# Patient Record
Sex: FEMALE | Race: WHITE | NOT HISPANIC OR LATINO
[De-identification: names, ages, dates, MRNs, and addresses within clinical notes are randomized per-mention and may not be internally consistent; named-entity substitution may affect disease eponyms.]

---

## 2017-02-16 ENCOUNTER — APPOINTMENT (OUTPATIENT)
Dept: ULTRASOUND IMAGING | Facility: CLINIC | Age: 37
End: 2017-02-16

## 2017-09-20 ENCOUNTER — EMERGENCY (EMERGENCY)
Facility: HOSPITAL | Age: 37
LOS: 1 days | Discharge: HOME | End: 2017-09-20
Attending: EMERGENCY MEDICINE | Admitting: EMERGENCY MEDICINE
Payer: COMMERCIAL

## 2017-09-20 VITALS
TEMPERATURE: 99 F | DIASTOLIC BLOOD PRESSURE: 85 MMHG | HEART RATE: 93 BPM | RESPIRATION RATE: 18 BRPM | OXYGEN SATURATION: 100 % | WEIGHT: 160.06 LBS | SYSTOLIC BLOOD PRESSURE: 137 MMHG

## 2017-09-20 PROCEDURE — 99284 EMERGENCY DEPT VISIT MOD MDM: CPT | Mod: 25

## 2017-09-20 PROCEDURE — 93010 ELECTROCARDIOGRAM REPORT: CPT

## 2017-09-20 PROCEDURE — 71020: CPT | Mod: 26

## 2017-09-20 PROCEDURE — 93005 ELECTROCARDIOGRAM TRACING: CPT

## 2017-09-20 PROCEDURE — 71046 X-RAY EXAM CHEST 2 VIEWS: CPT

## 2017-09-20 RX ORDER — ALPRAZOLAM 0.25 MG
1 TABLET ORAL
Qty: 6 | Refills: 0
Start: 2017-09-20 | End: 2017-09-22

## 2017-09-20 RX ORDER — ALPRAZOLAM 0.25 MG
0.25 TABLET ORAL ONCE
Qty: 0 | Refills: 0 | Status: DISCONTINUED | OUTPATIENT
Start: 2017-09-20 | End: 2017-09-20

## 2017-09-20 NOTE — ED PROVIDER NOTE - PLAN OF CARE
1. Take xanax 1 tablet up to every 8 hours as needed for severe anxiety. Do not drink alcohol or drive with this medication. You must follow-up with your primary care physician if you feel this medication is helpful to you to continue.   2. Hydrate yourself well throughout the day  3. See your doctor this week to discuss further treatment for anxiety  4. Return to the ER for any new or worsening symptoms

## 2017-09-20 NOTE — ED PROVIDER NOTE - CARE PLAN
Principal Discharge DX:	Anxiety  Instructions for follow-up, activity and diet:	1. Take xanax 1 tablet up to every 8 hours as needed for severe anxiety. Do not drink alcohol or drive with this medication. You must follow-up with your primary care physician if you feel this medication is helpful to you to continue.   2. Hydrate yourself well throughout the day  3. See your doctor this week to discuss further treatment for anxiety  4. Return to the ER for any new or worsening symptoms

## 2017-09-20 NOTE — ED PROVIDER NOTE - OBJECTIVE STATEMENT
36 y/o F pmhx anxiety, presenting with chest pain and shortness of breath x4 days. Pt states last week she 'was doing a lot of celebrating and drinking wine multiple days in a row,' then Sunday developed burning sensation in center of chest that has been intermittent since that time. She states that she has had shortness of breath intermittently associated with this. Reports had symptoms similar to this once in the past, was told by her PMD that she had anxiety. Is worried about her heart today but 'hopes it is just anxiety again.' She denies any drinking since symptoms started. Reports only had 2-3 glasses of wine on those days of drinking. Denies any drug abuse or cigarette smoking. Denies any long plane, train or car rides.

## 2017-09-20 NOTE — ED PROVIDER NOTE - ATTENDING CONTRIBUTION TO CARE
38 y/o F no pmhx p/w chest pain and shortness of breath described as burning sensation after a 3 days of drinking wine last week; no drinking since that time. PE unremarkable. Episodes of anxiety in the past, feels the same but more concerned. Will obtain cxr, provide xanax and obtain urine pregnancy, reassess,

## 2017-09-20 NOTE — ED PROVIDER NOTE - MEDICAL DECISION MAKING DETAILS
36 y/o F no pmhx p/w chest pain and shortness of breath described as burning sensation after a 3 days of drinking wine last week; no drinking since that time. PE unremarkable. Episodes of anxiety in the past, feels the same but more concerned. Will obtain cxr, provide xanax and obtain urine pregnancy, reassess,

## 2017-09-20 NOTE — ED PROVIDER NOTE - PROGRESS NOTE DETAILS
patient feeling improved states she feels less anxious that her urine pregnancy test is negative and EKG CXR is normal. will follow-up with pmd advised on possible need for further evaluation for anxiety and continued treatment. -Lola Henao PA-C

## 2017-09-20 NOTE — ED ADULT NURSE NOTE - OBJECTIVE STATEMENT
Pt comes in with epigastric burning Pt has hx of anxiety and chantal been drinking A LOT OF WINE PAST COUPLE OF DAYS pt also sates she ha s some shortness of breath past couple of days Lungs clear Pt  was offered Xanax but she is driving pending chest x-ray [facility]

## 2017-09-20 NOTE — ED ADULT NURSE REASSESSMENT NOTE - NS ED NURSE REASSESS COMMENT FT1
2030 PReg negative and pt is driving no Xanax given as ordered.  Pending x-ray and EKG NSR [facility]

## 2019-07-17 ENCOUNTER — OUTPATIENT (OUTPATIENT)
Dept: INPATIENT UNIT | Facility: HOSPITAL | Age: 39
LOS: 1 days | Discharge: HOME | End: 2019-07-17

## 2019-07-17 VITALS
RESPIRATION RATE: 16 BRPM | OXYGEN SATURATION: 100 % | HEART RATE: 105 BPM | TEMPERATURE: 98 F | SYSTOLIC BLOOD PRESSURE: 130 MMHG | DIASTOLIC BLOOD PRESSURE: 89 MMHG

## 2019-07-17 VITALS — HEART RATE: 72 BPM | DIASTOLIC BLOOD PRESSURE: 69 MMHG | SYSTOLIC BLOOD PRESSURE: 116 MMHG

## 2019-07-17 DIAGNOSIS — O26.899 OTHER SPECIFIED PREGNANCY RELATED CONDITIONS, UNSPECIFIED TRIMESTER: ICD-10-CM

## 2019-07-17 DIAGNOSIS — Z3A.00 WEEKS OF GESTATION OF PREGNANCY NOT SPECIFIED: ICD-10-CM

## 2019-07-17 LAB
ALBUMIN SERPL ELPH-MCNC: 3.6 G/DL — SIGNIFICANT CHANGE UP (ref 3.3–5)
ALP SERPL-CCNC: 186 U/L — HIGH (ref 40–120)
ALT FLD-CCNC: 20 U/L — SIGNIFICANT CHANGE UP (ref 4–33)
ANION GAP SERPL CALC-SCNC: 15 MMO/L — HIGH (ref 7–14)
APPEARANCE UR: SIGNIFICANT CHANGE UP
APTT BLD: 25.9 SEC — LOW (ref 27.5–36.3)
AST SERPL-CCNC: 20 U/L — SIGNIFICANT CHANGE UP (ref 4–32)
BACTERIA # UR AUTO: SIGNIFICANT CHANGE UP
BASOPHILS # BLD AUTO: 0.04 K/UL — SIGNIFICANT CHANGE UP (ref 0–0.2)
BASOPHILS NFR BLD AUTO: 0.4 % — SIGNIFICANT CHANGE UP (ref 0–2)
BILIRUB SERPL-MCNC: 0.2 MG/DL — SIGNIFICANT CHANGE UP (ref 0.2–1.2)
BILIRUB UR-MCNC: NEGATIVE — SIGNIFICANT CHANGE UP
BLD GP AB SCN SERPL QL: NEGATIVE — SIGNIFICANT CHANGE UP
BLOOD UR QL VISUAL: NEGATIVE — SIGNIFICANT CHANGE UP
BUN SERPL-MCNC: 12 MG/DL — SIGNIFICANT CHANGE UP (ref 7–23)
CALCIUM SERPL-MCNC: 9.2 MG/DL — SIGNIFICANT CHANGE UP (ref 8.4–10.5)
CHLORIDE SERPL-SCNC: 103 MMOL/L — SIGNIFICANT CHANGE UP (ref 98–107)
CO2 SERPL-SCNC: 19 MMOL/L — LOW (ref 22–31)
COLOR SPEC: YELLOW — SIGNIFICANT CHANGE UP
CREAT ?TM UR-MCNC: 73.3 MG/DL — SIGNIFICANT CHANGE UP
CREAT SERPL-MCNC: 0.68 MG/DL — SIGNIFICANT CHANGE UP (ref 0.5–1.3)
EOSINOPHIL # BLD AUTO: 0.07 K/UL — SIGNIFICANT CHANGE UP (ref 0–0.5)
EOSINOPHIL NFR BLD AUTO: 0.8 % — SIGNIFICANT CHANGE UP (ref 0–6)
FIBRINOGEN PPP-MCNC: 720 MG/DL — HIGH (ref 350–510)
GLUCOSE SERPL-MCNC: 87 MG/DL — SIGNIFICANT CHANGE UP (ref 70–99)
GLUCOSE UR-MCNC: NEGATIVE — SIGNIFICANT CHANGE UP
HCT VFR BLD CALC: 33.3 % — LOW (ref 34.5–45)
HGB BLD-MCNC: 11.2 G/DL — LOW (ref 11.5–15.5)
HYALINE CASTS # UR AUTO: NEGATIVE — SIGNIFICANT CHANGE UP
IMM GRANULOCYTES NFR BLD AUTO: 0.5 % — SIGNIFICANT CHANGE UP (ref 0–1.5)
INR BLD: 0.84 — LOW (ref 0.88–1.17)
KETONES UR-MCNC: NEGATIVE — SIGNIFICANT CHANGE UP
LDH SERPL L TO P-CCNC: 151 U/L — SIGNIFICANT CHANGE UP (ref 135–225)
LEUKOCYTE ESTERASE UR-ACNC: NEGATIVE — SIGNIFICANT CHANGE UP
LYMPHOCYTES # BLD AUTO: 1.97 K/UL — SIGNIFICANT CHANGE UP (ref 1–3.3)
LYMPHOCYTES # BLD AUTO: 21.1 % — SIGNIFICANT CHANGE UP (ref 13–44)
MCHC RBC-ENTMCNC: 29.6 PG — SIGNIFICANT CHANGE UP (ref 27–34)
MCHC RBC-ENTMCNC: 33.6 % — SIGNIFICANT CHANGE UP (ref 32–36)
MCV RBC AUTO: 87.9 FL — SIGNIFICANT CHANGE UP (ref 80–100)
MONOCYTES # BLD AUTO: 0.7 K/UL — SIGNIFICANT CHANGE UP (ref 0–0.9)
MONOCYTES NFR BLD AUTO: 7.5 % — SIGNIFICANT CHANGE UP (ref 2–14)
NEUTROPHILS # BLD AUTO: 6.5 K/UL — SIGNIFICANT CHANGE UP (ref 1.8–7.4)
NEUTROPHILS NFR BLD AUTO: 69.7 % — SIGNIFICANT CHANGE UP (ref 43–77)
NITRITE UR-MCNC: NEGATIVE — SIGNIFICANT CHANGE UP
NRBC # FLD: 0 K/UL — SIGNIFICANT CHANGE UP (ref 0–0)
PH UR: 6.5 — SIGNIFICANT CHANGE UP (ref 5–8)
PLATELET # BLD AUTO: 215 K/UL — SIGNIFICANT CHANGE UP (ref 150–400)
PMV BLD: 11.2 FL — SIGNIFICANT CHANGE UP (ref 7–13)
POTASSIUM SERPL-MCNC: 3.8 MMOL/L — SIGNIFICANT CHANGE UP (ref 3.5–5.3)
POTASSIUM SERPL-SCNC: 3.8 MMOL/L — SIGNIFICANT CHANGE UP (ref 3.5–5.3)
PROT SERPL-MCNC: 6.6 G/DL — SIGNIFICANT CHANGE UP (ref 6–8.3)
PROT UR-MCNC: 10 — SIGNIFICANT CHANGE UP
PROT UR-MCNC: 14.1 MG/DL — SIGNIFICANT CHANGE UP
PROTHROM AB SERPL-ACNC: 9.5 SEC — LOW (ref 9.8–13.1)
RBC # BLD: 3.79 M/UL — LOW (ref 3.8–5.2)
RBC # FLD: 13 % — SIGNIFICANT CHANGE UP (ref 10.3–14.5)
RBC CASTS # UR COMP ASSIST: SIGNIFICANT CHANGE UP (ref 0–?)
RH IG SCN BLD-IMP: POSITIVE — SIGNIFICANT CHANGE UP
SODIUM SERPL-SCNC: 137 MMOL/L — SIGNIFICANT CHANGE UP (ref 135–145)
SP GR SPEC: 1.01 — SIGNIFICANT CHANGE UP (ref 1–1.04)
SQUAMOUS # UR AUTO: SIGNIFICANT CHANGE UP
URATE SERPL-MCNC: 5.5 MG/DL — SIGNIFICANT CHANGE UP (ref 2.5–7)
UROBILINOGEN FLD QL: NORMAL — SIGNIFICANT CHANGE UP
WBC # BLD: 9.33 K/UL — SIGNIFICANT CHANGE UP (ref 3.8–10.5)
WBC # FLD AUTO: 9.33 K/UL — SIGNIFICANT CHANGE UP (ref 3.8–10.5)
WBC UR QL: HIGH (ref 0–?)

## 2019-07-17 NOTE — OB PROVIDER TRIAGE NOTE - NSHPPHYSICALEXAM_GEN_ALL_CORE
abdomen abdomen soft, nontender  taus: sliup, cephalic presentation, anterior placenta, bpp 8/8, domingo 15.5, efw 3173 grams  nst in progress

## 2019-07-17 NOTE — OB PROVIDER TRIAGE NOTE - HISTORY OF PRESENT ILLNESS
39 y.o.  LINDA 19 @ 40 weeks presents from scheduled prenatal appointment for evaluation of elevated BP in office of 140/100 x 4. 39 y.o.  LINDA 19 @ 40 weeks presents from scheduled prenatal appointment for evaluation of elevated BP in office of 140/100 x 4. Pt denies HA, visual disturbance, epigastric pain, RUQ pain. VE per Dr Zavala in office 0/0/-3/I.

## 2019-07-17 NOTE — OB PROVIDER TRIAGE NOTE - NSOBPROVIDERNOTE_OBGYN_ALL_OB_FT
39 y.o.  LINDA 19 @ 40 week IVF pregnancy presents from scheduled prenatal appointment for evaluation of elevated BP in office of 140/100 x 4. Pt denies HA, visual disturbance, epigastric pain, RUQ pain. Pt has no prior history of elevated BP in office. VE per Dr Zavala in office 0/0/-3/I.    allergies:  NKDA  medications:  prenatal vitamins  vitamin D supplement 2000 units    med/surg hx:  denies  OBGYN hx:  denies  psychosocial hx:  anxiety no meds    abdomen soft, nontender  taus: sliup, cephalic presentation, anterior placenta, bpp 8/8, domingo 15.5, efw 3173 grams  nst in progress    HELLP labs pending 39 y.o.  LINDA 19 @ 40 week IVF pregnancy presents from scheduled prenatal appointment for evaluation of elevated BP in office of 140/100 x 4. Pt denies HA, visual disturbance, epigastric pain, RUQ pain. Pt has no prior history of elevated BP in office. VE per Dr Zavala in office 0/0/-3/I.    allergies:  NKDA  medications:  prenatal vitamins  vitamin D supplement 2000 units    med/surg hx:  denies  OBGYN hx:  denies  psychosocial hx:  anxiety-no meds    abdomen soft, nontender  taus: sliup, cephalic presentation, anterior placenta, bpp 8/8, domingo 15.5, efw 3173 grams  nst in progress    HELLP labs pending 39 y.o.  LINDA 19 @ 40 week IVF pregnancy presents from scheduled prenatal appointment for evaluation of elevated BP in office of 140/100 x 4. Pt denies HA, visual disturbance, epigastric pain, RUQ pain. Pt has no prior history of elevated BP in office. VE per Dr Zavala in office 0/0/-3/I.    allergies:  NKDA  medications:  prenatal vitamins  vitamin D supplement 2000 units    med/surg hx:  denies  OBGYN hx:  denies  psychosocial hx:  anxiety-0.25 mg xanax 1 tab 3 times daily prn    abdomen soft, nontender  taus: sliup, cephalic presentation, anterior placenta, bpp 8/8, domingo 15.5, efw 3173 grams  nst in progress    HELLP labs pending 39 y.o.  LINDA 19 @ 40 week IVF pregnancy presents from scheduled prenatal appointment for evaluation of elevated BP in office of 140/100 x 4. Pt denies HA, visual disturbance, epigastric pain, RUQ pain. Pt has no prior history of elevated BP in office. VE per Dr Zavala in office 0/0/-3/I.    allergies:  NKDA  medications:  prenatal vitamins  vitamin D supplement 2000 units    med/surg hx:  denies  OBGYN hx:  denies  psychosocial hx:  anxiety-0.25 mg xanax 1 tab 3 times daily prn    abdomen soft, nontender  taus: sliup, cephalic presentation, anterior placenta, bpp 8/8, domingo 15.5, efw 3173 grams  nst in progress    HELLP labs pending      Received care of patient.   Bp Range: 116-137/64-80  HELLP Labs WNL  Discussed with Dr. Waddell:  -pt cleared for discharge home  -Labor precautions reviewed  -Kick counts reviewed  -Pt instructed to follow up with next scheduled appointment 2019  -Verbal and written discharge instructions given, Pt and  verbalized understanding

## 2019-07-18 ENCOUNTER — OUTPATIENT (OUTPATIENT)
Dept: INPATIENT UNIT | Facility: HOSPITAL | Age: 39
LOS: 1 days | Discharge: HOME | End: 2019-07-18
Payer: COMMERCIAL

## 2019-07-18 VITALS
SYSTOLIC BLOOD PRESSURE: 135 MMHG | TEMPERATURE: 98 F | DIASTOLIC BLOOD PRESSURE: 81 MMHG | HEART RATE: 85 BPM | RESPIRATION RATE: 20 BRPM

## 2019-07-18 VITALS — OXYGEN SATURATION: 97 % | HEART RATE: 82 BPM

## 2019-07-18 DIAGNOSIS — Z3A.00 WEEKS OF GESTATION OF PREGNANCY NOT SPECIFIED: ICD-10-CM

## 2019-07-18 DIAGNOSIS — O26.899 OTHER SPECIFIED PREGNANCY RELATED CONDITIONS, UNSPECIFIED TRIMESTER: ICD-10-CM

## 2019-07-18 LAB
ALBUMIN SERPL ELPH-MCNC: 3.3 G/DL — SIGNIFICANT CHANGE UP (ref 3.3–5)
ALP SERPL-CCNC: 172 U/L — HIGH (ref 40–120)
ALT FLD-CCNC: 16 U/L — SIGNIFICANT CHANGE UP (ref 4–33)
ANION GAP SERPL CALC-SCNC: 12 MMO/L — SIGNIFICANT CHANGE UP (ref 7–14)
APPEARANCE UR: SIGNIFICANT CHANGE UP
APTT BLD: 25.8 SEC — LOW (ref 27.5–36.3)
AST SERPL-CCNC: 17 U/L — SIGNIFICANT CHANGE UP (ref 4–32)
BACTERIA # UR AUTO: SIGNIFICANT CHANGE UP
BASOPHILS # BLD AUTO: 0.03 K/UL — SIGNIFICANT CHANGE UP (ref 0–0.2)
BASOPHILS NFR BLD AUTO: 0.3 % — SIGNIFICANT CHANGE UP (ref 0–2)
BILIRUB SERPL-MCNC: < 0.2 MG/DL — LOW (ref 0.2–1.2)
BILIRUB UR-MCNC: NEGATIVE — SIGNIFICANT CHANGE UP
BLOOD UR QL VISUAL: NEGATIVE — SIGNIFICANT CHANGE UP
BUN SERPL-MCNC: 10 MG/DL — SIGNIFICANT CHANGE UP (ref 7–23)
CALCIUM SERPL-MCNC: 9 MG/DL — SIGNIFICANT CHANGE UP (ref 8.4–10.5)
CHLORIDE SERPL-SCNC: 106 MMOL/L — SIGNIFICANT CHANGE UP (ref 98–107)
CO2 SERPL-SCNC: 19 MMOL/L — LOW (ref 22–31)
COLOR SPEC: YELLOW — SIGNIFICANT CHANGE UP
CREAT ?TM UR-MCNC: 69.9 MG/DL — SIGNIFICANT CHANGE UP
CREAT SERPL-MCNC: 0.64 MG/DL — SIGNIFICANT CHANGE UP (ref 0.5–1.3)
EOSINOPHIL # BLD AUTO: 0.09 K/UL — SIGNIFICANT CHANGE UP (ref 0–0.5)
EOSINOPHIL NFR BLD AUTO: 1 % — SIGNIFICANT CHANGE UP (ref 0–6)
FIBRINOGEN PPP-MCNC: 674.7 MG/DL — HIGH (ref 350–510)
GLUCOSE SERPL-MCNC: 92 MG/DL — SIGNIFICANT CHANGE UP (ref 70–99)
GLUCOSE UR-MCNC: NEGATIVE — SIGNIFICANT CHANGE UP
HCT VFR BLD CALC: 33.1 % — LOW (ref 34.5–45)
HGB BLD-MCNC: 10.9 G/DL — LOW (ref 11.5–15.5)
HYALINE CASTS # UR AUTO: NEGATIVE — SIGNIFICANT CHANGE UP
IMM GRANULOCYTES NFR BLD AUTO: 0.6 % — SIGNIFICANT CHANGE UP (ref 0–1.5)
INR BLD: 0.85 — LOW (ref 0.88–1.17)
KETONES UR-MCNC: NEGATIVE — SIGNIFICANT CHANGE UP
LDH SERPL L TO P-CCNC: 144 U/L — SIGNIFICANT CHANGE UP (ref 135–225)
LEUKOCYTE ESTERASE UR-ACNC: NEGATIVE — SIGNIFICANT CHANGE UP
LYMPHOCYTES # BLD AUTO: 2.43 K/UL — SIGNIFICANT CHANGE UP (ref 1–3.3)
LYMPHOCYTES # BLD AUTO: 26.9 % — SIGNIFICANT CHANGE UP (ref 13–44)
MCHC RBC-ENTMCNC: 29.5 PG — SIGNIFICANT CHANGE UP (ref 27–34)
MCHC RBC-ENTMCNC: 32.9 % — SIGNIFICANT CHANGE UP (ref 32–36)
MCV RBC AUTO: 89.7 FL — SIGNIFICANT CHANGE UP (ref 80–100)
MONOCYTES # BLD AUTO: 0.63 K/UL — SIGNIFICANT CHANGE UP (ref 0–0.9)
MONOCYTES NFR BLD AUTO: 7 % — SIGNIFICANT CHANGE UP (ref 2–14)
NEUTROPHILS # BLD AUTO: 5.81 K/UL — SIGNIFICANT CHANGE UP (ref 1.8–7.4)
NEUTROPHILS NFR BLD AUTO: 64.2 % — SIGNIFICANT CHANGE UP (ref 43–77)
NITRITE UR-MCNC: NEGATIVE — SIGNIFICANT CHANGE UP
NRBC # FLD: 0.08 K/UL — SIGNIFICANT CHANGE UP (ref 0–0)
PH UR: 6.5 — SIGNIFICANT CHANGE UP (ref 5–8)
PLATELET # BLD AUTO: 199 K/UL — SIGNIFICANT CHANGE UP (ref 150–400)
PMV BLD: 10.9 FL — SIGNIFICANT CHANGE UP (ref 7–13)
POTASSIUM SERPL-MCNC: 4.2 MMOL/L — SIGNIFICANT CHANGE UP (ref 3.5–5.3)
POTASSIUM SERPL-SCNC: 4.2 MMOL/L — SIGNIFICANT CHANGE UP (ref 3.5–5.3)
PROT SERPL-MCNC: 6.4 G/DL — SIGNIFICANT CHANGE UP (ref 6–8.3)
PROT UR-MCNC: 11.2 MG/DL — SIGNIFICANT CHANGE UP
PROT UR-MCNC: NEGATIVE — SIGNIFICANT CHANGE UP
PROTHROM AB SERPL-ACNC: 9.4 SEC — LOW (ref 9.8–13.1)
RBC # BLD: 3.69 M/UL — LOW (ref 3.8–5.2)
RBC # FLD: 12.9 % — SIGNIFICANT CHANGE UP (ref 10.3–14.5)
RBC CASTS # UR COMP ASSIST: SIGNIFICANT CHANGE UP (ref 0–?)
SODIUM SERPL-SCNC: 137 MMOL/L — SIGNIFICANT CHANGE UP (ref 135–145)
SP GR SPEC: 1.01 — SIGNIFICANT CHANGE UP (ref 1–1.04)
SQUAMOUS # UR AUTO: SIGNIFICANT CHANGE UP
URATE SERPL-MCNC: 5.3 MG/DL — SIGNIFICANT CHANGE UP (ref 2.5–7)
UROBILINOGEN FLD QL: NORMAL — SIGNIFICANT CHANGE UP
WBC # BLD: 9.04 K/UL — SIGNIFICANT CHANGE UP (ref 3.8–10.5)
WBC # FLD AUTO: 9.04 K/UL — SIGNIFICANT CHANGE UP (ref 3.8–10.5)
WBC UR QL: SIGNIFICANT CHANGE UP (ref 0–?)

## 2019-07-18 PROCEDURE — 59025 FETAL NON-STRESS TEST: CPT | Mod: 26

## 2019-07-18 NOTE — OB PROVIDER TRIAGE NOTE - NSHPPHYSICALEXAM_GEN_ALL_CORE
normal (ped)... Bp Range:119-130/71-83  Abdomen: Soft, non-tender on palpation  NST:  With moderate variability, accelerations, no decelerations  [place]: No ctx noted on toco   HELLP Labs pending

## 2019-07-18 NOTE — OB PROVIDER TRIAGE NOTE - NSHPLABSRESULTS_GEN_ALL_CORE
10.9   9.04  )-----------( 199      ( 2019 22:40 )             33.1   07-18    137  |  106  |  10  ----------------------------<  92  4.2   |  19<L>  |  0.64    Ca    9.0      2019 22:40  TPro  6.4  /  Alb  3.3  /  TBili  < 0.2<L>  /  DBili  x   /  AST  17  /  ALT  16  /  AlkPhos  172<H>    Urinalysis Basic - ( 2019 22:40 )  Color: YELLOW / Appearance: TURBID / S.011 / pH: 6.5  Gluc: NEGATIVE / Ketone: NEGATIVE  / Bili: NEGATIVE / Urobili: NORMAL   Blood: NEGATIVE / Protein: NEGATIVE / Nitrite: NEGATIVE   Leuk Esterase: NEGATIVE / RBC: 3-5 / WBC 6-11   Sq Epi: MODERATE / Non Sq Epi: x / Bacteria: FEW  PCR:0.16  PT:9.4  INR:0.85  PTT:25.8  Fibrinogen Assay:674.7  Uric Acid: 5.3  Lactate Dehydrogenase: 144

## 2019-07-18 NOTE — OB PROVIDER TRIAGE NOTE - HISTORY OF PRESENT ILLNESS
Pt of Dr. Zavala 38 y/o  @40.1 weeks gestation presents to triage with c/o elevated blood pressure at home. Pt states she used her brothers blood pressure machine to check her blood pressure since she had a elevated blood pressure in office yesterday her readings were 140/100 repeat 150/90. Pt has HELLP Labs drawn yesterday and all WNL. Pt denies headache, epigastric pain , edema and visual disturbances. Pt states that she has a episode of shortness of breathe after her elevated blood pressures. Pt states that she generally experiences this with her anxiety. Pt denies any shortness of breathe at this time.   Current AP course uncomplicated  Medical H/x: Denies  Surgical H/x: Denies  Ob/Gyn H/x: Denies  Psych H/x: Anxiety MDD 3  Meds: Xanax 0.25mg/ 3x a day  NKDA

## 2019-07-18 NOTE — OB PROVIDER TRIAGE NOTE - NSOBPROVIDERNOTE_OBGYN_ALL_OB_FT
No evidence of PEC. Discussed with Dr. Rodrigues:  -Pt cleared for discharge home  -Labor precautions reviewed  -Kick counts reviewed  -Pt instructed to follow up with appointment today  -Verbal and written discharge instructions given

## 2019-07-20 ENCOUNTER — INPATIENT (INPATIENT)
Facility: HOSPITAL | Age: 39
LOS: 2 days | Discharge: HOME | End: 2019-07-23
Attending: SPECIALIST | Admitting: SPECIALIST

## 2019-07-20 VITALS
TEMPERATURE: 98 F | HEART RATE: 72 BPM | SYSTOLIC BLOOD PRESSURE: 134 MMHG | DIASTOLIC BLOOD PRESSURE: 88 MMHG | RESPIRATION RATE: 16 BRPM

## 2019-07-20 DIAGNOSIS — Z3A.00 WEEKS OF GESTATION OF PREGNANCY NOT SPECIFIED: ICD-10-CM

## 2019-07-20 DIAGNOSIS — O42.92 FULL-TERM PREMATURE RUPTURE OF MEMBRANES, UNSPECIFIED AS TO LENGTH OF TIME BETWEEN RUPTURE AND ONSET OF LABOR: ICD-10-CM

## 2019-07-20 DIAGNOSIS — R87.89 OTHER ABNORMAL FINDINGS IN SPECIMENS FROM FEMALE GENITAL ORGANS: Chronic | ICD-10-CM

## 2019-07-20 DIAGNOSIS — O26.899 OTHER SPECIFIED PREGNANCY RELATED CONDITIONS, UNSPECIFIED TRIMESTER: ICD-10-CM

## 2019-07-20 LAB
BASOPHILS # BLD AUTO: 0.02 K/UL — SIGNIFICANT CHANGE UP (ref 0–0.2)
BASOPHILS NFR BLD AUTO: 0.2 % — SIGNIFICANT CHANGE UP (ref 0–2)
BLD GP AB SCN SERPL QL: NEGATIVE — SIGNIFICANT CHANGE UP
EOSINOPHIL # BLD AUTO: 0.05 K/UL — SIGNIFICANT CHANGE UP (ref 0–0.5)
EOSINOPHIL NFR BLD AUTO: 0.6 % — SIGNIFICANT CHANGE UP (ref 0–6)
HCT VFR BLD CALC: 33 % — LOW (ref 34.5–45)
HGB BLD-MCNC: 10.7 G/DL — LOW (ref 11.5–15.5)
IMM GRANULOCYTES NFR BLD AUTO: 0.4 % — SIGNIFICANT CHANGE UP (ref 0–1.5)
LYMPHOCYTES # BLD AUTO: 1.65 K/UL — SIGNIFICANT CHANGE UP (ref 1–3.3)
LYMPHOCYTES # BLD AUTO: 20 % — SIGNIFICANT CHANGE UP (ref 13–44)
MCHC RBC-ENTMCNC: 30 PG — SIGNIFICANT CHANGE UP (ref 27–34)
MCHC RBC-ENTMCNC: 32.4 % — SIGNIFICANT CHANGE UP (ref 32–36)
MCV RBC AUTO: 92.4 FL — SIGNIFICANT CHANGE UP (ref 80–100)
MONOCYTES # BLD AUTO: 0.46 K/UL — SIGNIFICANT CHANGE UP (ref 0–0.9)
MONOCYTES NFR BLD AUTO: 5.6 % — SIGNIFICANT CHANGE UP (ref 2–14)
NEUTROPHILS # BLD AUTO: 6.03 K/UL — SIGNIFICANT CHANGE UP (ref 1.8–7.4)
NEUTROPHILS NFR BLD AUTO: 73.2 % — SIGNIFICANT CHANGE UP (ref 43–77)
NRBC # FLD: 0 K/UL — SIGNIFICANT CHANGE UP (ref 0–0)
PLATELET # BLD AUTO: 177 K/UL — SIGNIFICANT CHANGE UP (ref 150–400)
PMV BLD: 11 FL — SIGNIFICANT CHANGE UP (ref 7–13)
RBC # BLD: 3.57 M/UL — LOW (ref 3.8–5.2)
RBC # FLD: 12.8 % — SIGNIFICANT CHANGE UP (ref 10.3–14.5)
RH IG SCN BLD-IMP: POSITIVE — SIGNIFICANT CHANGE UP
WBC # BLD: 8.24 K/UL — SIGNIFICANT CHANGE UP (ref 3.8–10.5)
WBC # FLD AUTO: 8.24 K/UL — SIGNIFICANT CHANGE UP (ref 3.8–10.5)

## 2019-07-20 RX ORDER — OXYTOCIN 10 UNIT/ML
VIAL (ML) INJECTION
Qty: 20 | Refills: 0 | Status: DISCONTINUED | OUTPATIENT
Start: 2019-07-20 | End: 2019-07-22

## 2019-07-20 RX ORDER — ONDANSETRON 8 MG/1
4 TABLET, FILM COATED ORAL ONCE
Refills: 0 | Status: COMPLETED | OUTPATIENT
Start: 2019-07-20 | End: 2019-07-20

## 2019-07-20 RX ORDER — SODIUM CHLORIDE 9 MG/ML
1000 INJECTION, SOLUTION INTRAVENOUS
Refills: 0 | Status: DISCONTINUED | OUTPATIENT
Start: 2019-07-20 | End: 2019-07-21

## 2019-07-20 RX ORDER — OXYTOCIN 10 UNIT/ML
2 VIAL (ML) INJECTION
Qty: 30 | Refills: 0 | Status: DISCONTINUED | OUTPATIENT
Start: 2019-07-20 | End: 2019-07-21

## 2019-07-20 RX ORDER — SODIUM CHLORIDE 9 MG/ML
1000 INJECTION, SOLUTION INTRAVENOUS ONCE
Refills: 0 | Status: COMPLETED | OUTPATIENT
Start: 2019-07-20 | End: 2019-07-20

## 2019-07-20 RX ORDER — BUTORPHANOL TARTRATE 2 MG/ML
2 INJECTION, SOLUTION INTRAMUSCULAR; INTRAVENOUS ONCE
Refills: 0 | Status: DISCONTINUED | OUTPATIENT
Start: 2019-07-20 | End: 2019-07-21

## 2019-07-20 RX ADMIN — SODIUM CHLORIDE 125 MILLILITER(S): 9 INJECTION, SOLUTION INTRAVENOUS at 18:50

## 2019-07-20 RX ADMIN — SODIUM CHLORIDE 1000 MILLILITER(S): 9 INJECTION, SOLUTION INTRAVENOUS at 20:42

## 2019-07-20 RX ADMIN — ONDANSETRON 4 MILLIGRAM(S): 8 TABLET, FILM COATED ORAL at 20:57

## 2019-07-20 RX ADMIN — SODIUM CHLORIDE 125 MILLILITER(S): 9 INJECTION, SOLUTION INTRAVENOUS at 19:21

## 2019-07-20 NOTE — OB PROVIDER TRIAGE NOTE - NSHPPHYSICALEXAM_GEN_ALL_CORE
Assessment reveals VSS, abdomen soft, NT, gravid.  SSE neg pooling, pos nitrizine, pos fern  VE 0.5/80/-3  EFW 7.0 by palp  BPP 8/8, TERRI 8.9, vtx

## 2019-07-20 NOTE — CHART NOTE - NSCHARTNOTEFT_GEN_A_CORE
R2 prog note    Pt examined due to increased pain      VE: 2.5/80/-2  EFM: 150/mod/+accels/-decels  [place]: Q2-3min      T(C): 36.6 (07-20-19 @ 18:30), Max: 37.4 (07-20-19 @ 12:30)  HR: 65 (07-20-19 @ 19:34) (63 - 81)  BP: 138/81 (07-20-19 @ 19:34) (123/71 - 143/83)  RR: 16 (07-20-19 @ 10:05) (16 - 16)  SpO2: --      Plan:  -Stadol 2mg IV  -Continue PO cytotec      D/w Dr. Lilia alvarez pgy-2

## 2019-07-20 NOTE — OB PROVIDER TRIAGE NOTE - HISTORY OF PRESENT ILLNESS
40yo  @ 40.3 presents with c/o irregular contractions and LOF since 1030 yesterday morning.   Denies VB and reports GFM.     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

## 2019-07-20 NOTE — CHART NOTE - NSCHARTNOTEFT_GEN_A_CORE
Pt comfortable sp epidural  135. accels, mod variability  2-4MIN CTXS  VE - 4/90/-2  A/P - PROM/Cont induction          Pitocin

## 2019-07-21 ENCOUNTER — TRANSCRIPTION ENCOUNTER (OUTPATIENT)
Age: 39
End: 2019-07-21

## 2019-07-21 LAB — T PALLIDUM AB TITR SER: NEGATIVE — SIGNIFICANT CHANGE UP

## 2019-07-21 RX ORDER — CEFAZOLIN SODIUM 1 G
1000 VIAL (EA) INJECTION ONCE
Refills: 0 | Status: COMPLETED | OUTPATIENT
Start: 2019-07-21 | End: 2019-07-21

## 2019-07-21 RX ORDER — ACETAMINOPHEN 500 MG
975 TABLET ORAL
Refills: 0 | Status: DISCONTINUED | OUTPATIENT
Start: 2019-07-21 | End: 2019-07-23

## 2019-07-21 RX ORDER — MAGNESIUM HYDROXIDE 400 MG/1
30 TABLET, CHEWABLE ORAL
Refills: 0 | Status: DISCONTINUED | OUTPATIENT
Start: 2019-07-21 | End: 2019-07-23

## 2019-07-21 RX ORDER — PRAMOXINE HYDROCHLORIDE 150 MG/15G
1 AEROSOL, FOAM RECTAL EVERY 4 HOURS
Refills: 0 | Status: DISCONTINUED | OUTPATIENT
Start: 2019-07-21 | End: 2019-07-23

## 2019-07-21 RX ORDER — DIBUCAINE 1 %
1 OINTMENT (GRAM) RECTAL EVERY 6 HOURS
Refills: 0 | Status: DISCONTINUED | OUTPATIENT
Start: 2019-07-21 | End: 2019-07-23

## 2019-07-21 RX ORDER — OXYCODONE HYDROCHLORIDE 5 MG/1
5 TABLET ORAL ONCE
Refills: 0 | Status: DISCONTINUED | OUTPATIENT
Start: 2019-07-21 | End: 2019-07-23

## 2019-07-21 RX ORDER — DIPHENHYDRAMINE HCL 50 MG
25 CAPSULE ORAL EVERY 6 HOURS
Refills: 0 | Status: DISCONTINUED | OUTPATIENT
Start: 2019-07-21 | End: 2019-07-23

## 2019-07-21 RX ORDER — ACETAMINOPHEN 500 MG
3 TABLET ORAL
Qty: 0 | Refills: 0 | DISCHARGE
Start: 2019-07-21

## 2019-07-21 RX ORDER — GLYCERIN ADULT
1 SUPPOSITORY, RECTAL RECTAL AT BEDTIME
Refills: 0 | Status: DISCONTINUED | OUTPATIENT
Start: 2019-07-21 | End: 2019-07-23

## 2019-07-21 RX ORDER — LANOLIN
1 OINTMENT (GRAM) TOPICAL EVERY 6 HOURS
Refills: 0 | Status: DISCONTINUED | OUTPATIENT
Start: 2019-07-21 | End: 2019-07-23

## 2019-07-21 RX ORDER — SODIUM CHLORIDE 9 MG/ML
3 INJECTION INTRAMUSCULAR; INTRAVENOUS; SUBCUTANEOUS EVERY 8 HOURS
Refills: 0 | Status: DISCONTINUED | OUTPATIENT
Start: 2019-07-21 | End: 2019-07-23

## 2019-07-21 RX ORDER — BENZOCAINE 10 %
1 GEL (GRAM) MUCOUS MEMBRANE EVERY 6 HOURS
Refills: 0 | Status: DISCONTINUED | OUTPATIENT
Start: 2019-07-21 | End: 2019-07-23

## 2019-07-21 RX ORDER — IBUPROFEN 200 MG
600 TABLET ORAL EVERY 6 HOURS
Refills: 0 | Status: DISCONTINUED | OUTPATIENT
Start: 2019-07-21 | End: 2019-07-23

## 2019-07-21 RX ORDER — KETOROLAC TROMETHAMINE 30 MG/ML
30 SYRINGE (ML) INJECTION ONCE
Refills: 0 | Status: DISCONTINUED | OUTPATIENT
Start: 2019-07-21 | End: 2019-07-21

## 2019-07-21 RX ORDER — DOCUSATE SODIUM 100 MG
100 CAPSULE ORAL
Refills: 0 | Status: DISCONTINUED | OUTPATIENT
Start: 2019-07-21 | End: 2019-07-23

## 2019-07-21 RX ORDER — SIMETHICONE 80 MG/1
80 TABLET, CHEWABLE ORAL EVERY 4 HOURS
Refills: 0 | Status: DISCONTINUED | OUTPATIENT
Start: 2019-07-21 | End: 2019-07-23

## 2019-07-21 RX ORDER — AER TRAVELER 0.5 G/1
1 SOLUTION RECTAL; TOPICAL EVERY 4 HOURS
Refills: 0 | Status: DISCONTINUED | OUTPATIENT
Start: 2019-07-21 | End: 2019-07-23

## 2019-07-21 RX ORDER — HYDROCORTISONE 1 %
1 OINTMENT (GRAM) TOPICAL EVERY 6 HOURS
Refills: 0 | Status: DISCONTINUED | OUTPATIENT
Start: 2019-07-21 | End: 2019-07-23

## 2019-07-21 RX ORDER — IBUPROFEN 200 MG
600 TABLET ORAL EVERY 6 HOURS
Refills: 0 | Status: COMPLETED | OUTPATIENT
Start: 2019-07-21 | End: 2020-06-18

## 2019-07-21 RX ORDER — OXYCODONE HYDROCHLORIDE 5 MG/1
5 TABLET ORAL
Refills: 0 | Status: DISCONTINUED | OUTPATIENT
Start: 2019-07-21 | End: 2019-07-23

## 2019-07-21 RX ORDER — IBUPROFEN 200 MG
1 TABLET ORAL
Qty: 0 | Refills: 0 | DISCHARGE
Start: 2019-07-21

## 2019-07-21 RX ORDER — OXYTOCIN 10 UNIT/ML
333.33 VIAL (ML) INJECTION
Qty: 20 | Refills: 0 | Status: DISCONTINUED | OUTPATIENT
Start: 2019-07-21 | End: 2019-07-22

## 2019-07-21 RX ORDER — TETANUS TOXOID, REDUCED DIPHTHERIA TOXOID AND ACELLULAR PERTUSSIS VACCINE, ADSORBED 5; 2.5; 8; 8; 2.5 [IU]/.5ML; [IU]/.5ML; UG/.5ML; UG/.5ML; UG/.5ML
0.5 SUSPENSION INTRAMUSCULAR ONCE
Refills: 0 | Status: COMPLETED | OUTPATIENT
Start: 2019-07-21

## 2019-07-21 RX ADMIN — Medication 100 MILLIGRAM(S): at 07:37

## 2019-07-21 RX ADMIN — Medication 1000 MILLIUNIT(S)/MIN: at 06:52

## 2019-07-21 RX ADMIN — Medication 30 MILLIGRAM(S): at 09:48

## 2019-07-21 NOTE — DISCHARGE NOTE OB - MEDICATION SUMMARY - MEDICATIONS TO CHANGE
I will SWITCH the dose or number of times a day I take the medications listed below when I get home from the hospital:  None negative

## 2019-07-21 NOTE — CHART NOTE - NSCHARTNOTEFT_GEN_A_CORE
Pt seen and examined. Does not feel urge to push    VS  T(C): 36.7 (07-21-19 @ 02:45)  HR: 89 (07-21-19 @ 04:41)  BP: 109/57 (07-21-19 @ 04:22)  RR: 15 (07-21-19 @ 02:45)  SpO2: 95% (07-21-19 @ 04:36)    VE: 10/100/+1  FHT: 150, mod ghulam, +accels, no decels  [place]: q2    P:  - Continue labor for another 30-60 minutes until patient feels urge to push, then plan to begin pushing    d/w Dr. Lilia Ba, PGY-4  Pager #[phone] ([facility]), [phone] ([place])

## 2019-07-21 NOTE — DISCHARGE NOTE OB - CARE PROVIDER_API CALL
Kory Zavala)  Obstetrics and Gynecology  [address] [facility], [address] [place] [address]  Phone: [phone]  Fax: [phone]  Follow Up Time:

## 2019-07-21 NOTE — OB RN DELIVERY SUMMARY - BABY A: APGAR 1 MIN SCORE, DELIVERY
PLEASE FOLLOW UP WITH YOUR PRIMARY CARE PHYSICIAN IN REGARDS TO THIS URGENT CARE VISIT    TREATMENT PLAN FOR TODAY'S VISIT:        1.  Diflucan 1 tablet now.    2.  Nystatin cream:  Apply this in affected area upto three times daily as needed    3.  If the symptoms continue, you will have to be seen by a GI specialist.     4.  Place an ice pack over the affected area, to reduce the swelling and numb the pain.  You can place, for example, a zip lock bag full of ice, place a towel or plastic bag in between the Ziplock and the wound/skin, so that the condensation does not cause the bandage to become wet.      5.  No warm or hot showers, which can make your itching worse.  Try to stick with cold showers and cold compresses over the affected areas.  Ice packs placed on the affected area will help with the swelling, redness and itching.     6.  OPTIONS FOR ITCHING:   A.  Zyrtec 10 mg upto twice daily or   B.  BENADRYL (OTC oral)     Children 2 to <6 years: 6.25 mg every 4-6 hours; maximum: 37.5 mg/day   Children 6 to <12 years: 12.5-25 mg   every 4-6 hours; maximum: 150  mg/day     Children ?12 years and Adults: 25-50 mg  every 4-6 hours; maximum:  300 mg/day        7.  Keep the affected area as dry as possible.  Safer soaps (Ivory, Aveeno, Dove) and water to cleanse the wound.  Afterwards, dry the area thoroughly.    Safer skin lotions include Aveeno, Cetaphil, Lubriderm or Eucerin (no preservatives)      Sexually transmitted diseases (eg, condyloma, herpes, syphilis, gonorrhea) and Candida can cause anal pruritus in patients with risk factors. Parasitic infection with Enterobius vermicularis (pinworm) is associated with characteristic nocturnal anal pruritus and is more common in children as compared with adults.  
8

## 2019-07-21 NOTE — CHART NOTE - NSCHARTNOTEFT_GEN_A_CORE
R1 Progress Note    Pt checked for increased discomfort.     Vital Signs Last 24 Hrs  T(C): 36.7 (21 Jul 2019 02:45), Max: 37.4 (20 Jul 2019 12:30)  T(F): 98.06 (21 Jul 2019 02:45), Max: 99.32 (20 Jul 2019 12:30)  HR: 67 (21 Jul 2019 02:46) (63 - 93)  BP: 133/78 (21 Jul 2019 02:36) (117/60 - 148/72)  RR: 15 (21 Jul 2019 02:45) (15 - 16)  SpO2: 99% (21 Jul 2019 02:46) (93% - 100%)    /mod/+accel/-decel  [place] q2-3min  VE 6/100/-2    ok per top-off  c/w pit per Dr. Lilia Atkinson PGY-1

## 2019-07-21 NOTE — OB PROVIDER DELIVERY SUMMARY - NSPLACINTACT_OBGYN_ALL_OB
[facility] INPATIENT ENCOUNTER  INTERNAL MEDICINE DISCHARGE SUMMARY NOTE    ADMISSION DATE:  2/13/2017  DISCHARGE DATE:  2/15/17  DISCHARGING PHYSICIAN:  Lance Vasquez DO  ATTENDING PHYSICIAN:  Lance Vasquez DO    CODE STATUS:  Full Resuscitation    DISCHARGE DIAGNOSIS:     Gross hematuria status post cystoscopy, found to have urethral stricture and urethrotomy  Postoperative fevers, low-grade, treated with vancomycin, cefepime and Levaquin, afebrile for >24 hours  Esophageal cancer  History of hypertension, controlled  Obesity  History of dyslipidemia  Impaired fasting glucose, last A1c was actually 5.4  Obstructive sleep apnea  Acid reflux    OTHER DIAGNOSES:    Past Medical History   Diagnosis Date   • Arthritis    • Bilateral leg edema    • BPH (benign prostatic hyperplasia)    • Cataract    • Colon Polyp 11/2016   • Diverticulosis of large intestine without diverticulitis    • Dysphagia    • Esophageal cancer 11/16/2016     Poorly Diff. Adenocarcinoma   • Esophageal ulceration 11/16/2016     Distal Esophageal Ulceration   • Essential (primary) hypertension    • Fracture, tibia 2014\"healed on own\"   • Gastroesophageal reflux disease    • Glaucoma (increased eye pressure)    • Hematuria    • Hyperlipidemia    • Kidney stone      developed in bladder   • Lyme disease    • PPD positive, treated      several years ago   • Sleep apnea      no CPAP, sleeps on side   • Urethral stricture    • Wears glasses        DISCHARGE DISPOSITION:    Home    CONDITION AT DISCHARGE:    stable     DISCHARGE INSTRUCTIONS:    DISCHARGE MEDICATIONS:  See Discharge Medication Reconciliation List  FOLLOW-UP:  Dr Vasquez and Dr Rivera in 1-2 weeks  DIET:  regular diet  ACTIVITY:  activity as tolerated  WOUND CARE:  none needed  SPECIAL INSTRUCTIONS:    Ben[facility]z cares at home    READMISSION RISK FACTORS:    Prior Hospitalization in last 6 months and Problem Diagnosis    REASON FOR ADMISSION:    Kofi Bishop is a 73 year old male who was  admitted on 2/13/2017 after his cystoscopy. He had an elective cystoscopy done to rule out an underlying mass given some significant gross hematuria and history of cancer. He was found to have a urethral stricture and had a urethrotomy done. Benitez catheter was placed, however the patient developed some postoperative low-grade fevers and chills. He was admitted for further evaluation and management.    HOSPITAL COURSE:    Post procedure, Kofi underwent multiple cultures, chest x-ray was negative. He was evaluated by infectious disease as well. He was treated initially empirically with IV vancomycin, cefepime and Levaquin. Additional antibiotics were held. The patient never had a significant leukocytosis. He seemed be tolerating his catheter fairly well, and additional infectious workup has been negative. He is now stable, no further fevers for over 24 hours off antibiotics. Urine culture is negative, blood cultures are negative to date. He's been cleared for discharge from a urology and infectious disease standpoint and will be discharged home after Benitez catheter education is completed.    OBJECTIVE:    VITALS:    Patient Vitals for the past 24 hrs:   BP Temp Temp src Pulse Resp SpO2 Height Weight   02/15/17 0724 133/74 98.6 °F (37 °C) Oral 73 15 95 % - -   02/15/17 0535 119/56 98.5 °F (36.9 °C) Oral 69 16 95 % - 96.1 kg   02/14/17 2100 120/64 98.6 °F (37 °C) Oral 90 16 96 % - -   02/14/17 1700 126/62 98.8 °F (37.1 °C) Oral 90 16 96 % - -   02/14/17 1305 120/60 99.7 °F (37.6 °C) Oral 93 16 95 % 5' 4\" (1.626 m) 93.7 kg        PHYSICAL EXAM:    General appearance: alert, appears stated age and cooperative  Lungs: clear to auscultation bilaterally  Heart: regular rate and rhythm, S1, S2 normal, no murmur, click, rub or gallop  Abdomen: Soft, non-tender; bowel sounds normal; no masses, no organomegaly  Extremities: Homans sign is negative, no sign of deep vein thrombosis and no edema, redness or tenderness in the calves  or thighs    SIGNIFICANT FINDINGS/COMPLICATIONS:    Postop fevers as above    CONSULTS:    Urology  Infectious disease    PROCEDURES:    Cysto as above    SIGNIFICANT DIAGNOSTIC STUDIES:      Recent Labs  Lab 02/14/17  1815 02/14/17  0947  02/13/17  1215 02/09/17  1443   SODIUM  --  139  < >  --  141   POTASSIUM 4.1 3.7  < >  --  3.9   CHLORIDE  --  103  < >  --  104   CO2  --  25  < >  --  27   BUN  --  7*  < >  --  9*   CREATININE  --  0.77  < >  --  0.79   GLUCOSE  --  91  < >  --  144*   ALBUMIN  --   --   --   --  3.3*   AST  --   --   --   --  23   BILIRUBIN  --   --   --   --  0.5   CPK  --   --   --  40  --    < > = values in this interval not displayed.    Pet Ct - Restaging    Result Date: 2/7/2017  PET CT FDG SCAN SKULL BASE TO MID-THIGH HISTORY: 73-year-old male with history of esophageal carcinoma, presenting for subsequent treatment surgery. Endoscopic ultrasound on 11/23/2016 demonstrated a large hypoechoic mass in the distal esophagus. Biopsy result: Ulcerated invasive poorly differentiated adenocarcinoma with signet ring features. Status post chemoradiation, with last chemotherapy therapy on 1/11/2017 and a last radiation therapy on 1/17/2017. Tentative plan for esophagectomy and gastric pull-through. This is a follow-up study to assess residual disease. PROCEDURE: The patient's blood glucose was 108 mg/dL. Patient was given 17.599 mCi of F-18 fluorodeoxyglucose intravenously. After a delay of approximately 1 hour, a PET scan from the skull base through the mid-thighs was obtained. Additionally, an unenhanced, low radiation dose CT scan was obtained for attenuation correction and anatomic localization. COMPARISON: PET/CT dated 11/20/2016. FINDINGS: Head/Neck: Focal increased FDG uptake within the right lateral mandibular alveolus, max SUV of 3.6, likely due to dental disease. Mild asymmetric uptake within the left palatine tonsils, max SUV of 3.7, nonspecific, likely reactive. Otherwise, normal  Yes physiologic FDG uptake. No enlarged or hypermetabolic lymph nodes. Chest/abdomen: No focal activity seen within the esophagus. FDG uptake within the GE junction (in the area of patient's known mass) is similar to background gastroesophageal uptake). New hypermetabolic rounded area of consolidation within right lower lobe (2:112), measuring 1.6 x 2.1 x 1.6 cm demonstrating central cavitary component, SUV max of 10.5.  Slight increased FDG uptake within the right hilar region, presumably tiny lymph nodes, not discernible on low-dose CT, max SUV of 2.4 (3:94). Subcentimeter lymph nodes within the gastrohepatic region/anterior to the GE junction (2:118), demonstrating low-grade FDG activity, not significantly higher than blood pool activity. Also, stable non-FDG avid right retrocrural node. Heterogeneous appearance of the thyroid parenchyma. Stable right renal cysts.Scattered colonic diverticulosis. Scattered atheromatous disease of the aorta and its branches. Musculoskeletal: Interval placement of percutaneous J-tube with increased FDG uptake along the tract, max of CT of 7.0. Mild FDG uptake is seen within the right lower pelvis anterior abdominal subcutaneous tissue (2:210), max SUV of 1.6. There is a curvilinear tract extending to the abdominal wall, likely iatrogenic. Correlate with clinical history. Mildly increased uptake within the right greater trochanter region, max SUV of 3.0, consistent with tendinitis/bursitis. Decreased marrow activity within the sternum, lower thoracic and upper lumbar spine related to radiation changes. Probable right hip tendinitis/bursitis. Ossifications the interspinous ligaments. Stable benign-appearing right iliac and left ischial bone lesions.     IMPRESSION: 1.  No focal activity seen within the GE junction in the area of the patient's known mass above physiologic gastric activity. No definite evidence of FDG avid regional briana metastasis. 2.  New hypermetabolic 2.1 cm rounded  area of consolidation within right lower lobe demonstrating central cavitary component, most suspicious for pneumonia. Slightly hypermetabolic right hilar lymph node, likely reactive. Metastatic disease less favored. Recommend short-term follow-up CT chest to ensure resolution. I have reviewed the images, and agree with the Resident interpretation.      STUDIES PENDING AT TIME OF DISCHARGE:  Blood cultures    STUDIES THAT NEED TO BE ORDERED AT FOLLOW UP:  CBC BMP    CASE DISCUSSED WITH:  Patient and RN    TIME TAKEN FOR DISCHARGE:  45 minutes    Discharge instructions, medications and followup appointment were discussed with the patient and after visit summary was given.

## 2019-07-21 NOTE — OB NEONATOLOGY/PEDIATRICIAN DELIVERY SUMMARY - NSPEDSNEONOTESA_OBGYN_ALL_OB_FT
Baby born @ 40.4 weeks via  with heavy mec & prolonged rupture to a 36 y/o A+  mother.  Maternal hx HPV & anxiety. PNL NR/non-immune/-.  GBS neg  .  SROM at 1030 on  with meconium fluids.  Baby emerged vigorous with good cry.  W/d/s/s with APGARs of 8/9.  Mom desires hep B, breast feeding. NO circ. EOS 1.2. Transferred to NICU for rule-out sepsis due to high EOS score & heavy mec requiring CPAP.    :  TOB:  ADOD:  BW: Baby born @ 40.4 weeks via  with heavy mec & prolonged rupture to a 38 y/o A+  mother.  Maternal hx HPV & anxiety. PNL NR/non-immune/-.  GBS neg  .  SROM at 1030 on  with meconium fluids.  Baby emerged vigorous with good cry.  W/d/s/s with APGARs of 8/9.  Mom desires hep B, breast feeding. NO circ. EOS 1.2. Transferred to NICU for rule-out sepsis due to high EOS score & heavy mec requiring CPAP.

## 2019-07-21 NOTE — DISCHARGE NOTE OB - PATIENT PORTAL LINK FT
You can access the [facility] Patient Portal, offered by [facility], by registering with the following website: [email or web address]

## 2019-07-21 NOTE — DISCHARGE NOTE OB - MEDICATION SUMMARY - MEDICATIONS TO TAKE
I will START or STAY ON the medications listed below when I get home from the hospital:    Prenatal Vitamins  -- 1 tab(s) by mouth once a day  -- Indication: For Weeks of gestation of pregnancy not specified    acetaminophen 325 mg oral tablet  -- 3 tab(s) by mouth   -- Indication: For Weeks of gestation of pregnancy not specified    ibuprofen 600 mg oral tablet  -- 1 tab(s) by mouth every 6 hours  -- Indication: For Weeks of gestation of pregnancy not specified

## 2019-07-21 NOTE — CHART NOTE - NSCHARTNOTEFT_GEN_A_CORE
R2 prog note    Pt seen for replacement of IUPC      VE: 6/100/-2, IUPC placed  EFM:150/mod/+accels/-decels  [place]:Q3-4min      T(C): 36.7 (07-21-19 @ 02:45), Max: 37.4 (07-20-19 @ 12:30)  HR: 94 (07-21-19 @ 03:46) (63 - 108)  BP: 117/72 (07-21-19 @ 03:38) (117/60 - 148/72)  RR: 15 (07-21-19 @ 02:45) (15 - 16)  SpO2: 99% (07-21-19 @ 03:41) (85% - 100%)      Plan:  -Continue pitocin, titrate based on MVU  -peanut ball  -Dr. Rodrigues in house        kim pgy-2

## 2019-07-22 RX ADMIN — Medication 975 MILLIGRAM(S): at 09:13

## 2019-07-22 RX ADMIN — Medication 975 MILLIGRAM(S): at 10:00

## 2019-07-22 RX ADMIN — Medication 1 TABLET(S): at 13:29

## 2019-07-22 NOTE — PROGRESS NOTE ADULT - SUBJECTIVE AND OBJECTIVE BOX
S: Patient doing well.     Pain controlled.  +OOB.  +void.    Tolerating PO.  Lochia WNL.    O: Vital Signs Last 24 Hrs  T(C): 36.5 (2019 05:46), Max: 37 (2019 07:15)  T(F): 97.7 (2019 05:46), Max: 98.6 (2019 07:15)  HR: 79 (2019 05:46) (75 - 116)  BP: 99/63 (2019 05:46) (99/63 - 142/60)  BP(mean): --  RR: 17 (2019 05:46) (16 - 18)  SpO2: 99% (2019 05:46) (92% - 100%)      Pt without complaints  vital signs stable  Abdomen soft  fundus firm, non tender  extremities non tender  lochia wnl    Patient doing well  Routine post partum care    Labs:                        10.7   8.24  )-----------( 177      ( 2019 10:50 )             33.0       ABO Interpretation: A ( @ 10:41)    Rh Interpretation: Positive ( @ 10:41)    Antibody Screen Negative      A: 39y s/p  doing well.   -Continue routine postpartum care.  -Discharge planned for tomorrow

## 2019-07-23 VITALS
OXYGEN SATURATION: 100 % | DIASTOLIC BLOOD PRESSURE: 70 MMHG | RESPIRATION RATE: 16 BRPM | HEART RATE: 81 BPM | SYSTOLIC BLOOD PRESSURE: 125 MMHG | TEMPERATURE: 98 F

## 2019-07-23 RX ORDER — TETANUS TOXOID, REDUCED DIPHTHERIA TOXOID AND ACELLULAR PERTUSSIS VACCINE, ADSORBED 5; 2.5; 8; 8; 2.5 [IU]/.5ML; [IU]/.5ML; UG/.5ML; UG/.5ML; UG/.5ML
0.5 SUSPENSION INTRAMUSCULAR ONCE
Refills: 0 | Status: COMPLETED | OUTPATIENT
Start: 2019-07-23 | End: 2019-07-23

## 2019-07-23 RX ADMIN — TETANUS TOXOID, REDUCED DIPHTHERIA TOXOID AND ACELLULAR PERTUSSIS VACCINE, ADSORBED 0.5 MILLILITER(S): 5; 2.5; 8; 8; 2.5 SUSPENSION INTRAMUSCULAR at 10:29

## 2019-07-23 RX ADMIN — Medication 100 MILLIGRAM(S): at 10:40

## 2019-09-17 ENCOUNTER — APPOINTMENT (OUTPATIENT)
Dept: INTERNAL MEDICINE | Facility: CLINIC | Age: 39
End: 2019-09-17
Payer: COMMERCIAL

## 2019-09-17 VITALS
SYSTOLIC BLOOD PRESSURE: 113 MMHG | RESPIRATION RATE: 15 BRPM | BODY MASS INDEX: 26.2 KG/M2 | OXYGEN SATURATION: 98 % | DIASTOLIC BLOOD PRESSURE: 76 MMHG | HEART RATE: 91 BPM | HEIGHT: 66 IN | WEIGHT: 163 LBS | TEMPERATURE: 98.9 F

## 2019-09-17 PROCEDURE — 99385 PREV VISIT NEW AGE 18-39: CPT | Mod: 25

## 2019-09-17 PROCEDURE — 36415 COLL VENOUS BLD VENIPUNCTURE: CPT

## 2019-09-20 LAB
25(OH)D3 SERPL-MCNC: 33.5 NG/ML
ALBUMIN SERPL ELPH-MCNC: 4.7 G/DL
ALP BLD-CCNC: 99 U/L
ALT SERPL-CCNC: 36 U/L
ANION GAP SERPL CALC-SCNC: 15 MMOL/L
APPEARANCE: CLEAR
AST SERPL-CCNC: 23 U/L
BACTERIA: NEGATIVE
BASOPHILS # BLD AUTO: 0.06 K/UL
BASOPHILS NFR BLD AUTO: 0.9 %
BILIRUB SERPL-MCNC: 0.3 MG/DL
BILIRUBIN URINE: NEGATIVE
BLOOD URINE: NEGATIVE
BUN SERPL-MCNC: 14 MG/DL
CALCIUM SERPL-MCNC: 9.5 MG/DL
CHLORIDE SERPL-SCNC: 103 MMOL/L
CHOLEST SERPL-MCNC: 217 MG/DL
CHOLEST/HDLC SERPL: 3.1 RATIO
CO2 SERPL-SCNC: 24 MMOL/L
COLOR: NORMAL
CREAT SERPL-MCNC: 0.61 MG/DL
EOSINOPHIL # BLD AUTO: 0.15 K/UL
EOSINOPHIL NFR BLD AUTO: 2.3 %
GLUCOSE QUALITATIVE U: NEGATIVE
GLUCOSE SERPL-MCNC: 88 MG/DL
HCT VFR BLD CALC: 38.8 %
HDLC SERPL-MCNC: 70 MG/DL
HGB BLD-MCNC: 12.2 G/DL
HYALINE CASTS: 0 /LPF
IMM GRANULOCYTES NFR BLD AUTO: 0.3 %
KETONES URINE: NEGATIVE
LDLC SERPL CALC-MCNC: 136 MG/DL
LEUKOCYTE ESTERASE URINE: NEGATIVE
LYMPHOCYTES # BLD AUTO: 2.18 K/UL
LYMPHOCYTES NFR BLD AUTO: 33.7 %
MAN DIFF?: NORMAL
MCHC RBC-ENTMCNC: 29.3 PG
MCHC RBC-ENTMCNC: 31.4 GM/DL
MCV RBC AUTO: 93 FL
MICROSCOPIC-UA: NORMAL
MONOCYTES # BLD AUTO: 0.53 K/UL
MONOCYTES NFR BLD AUTO: 8.2 %
NEUTROPHILS # BLD AUTO: 3.52 K/UL
NEUTROPHILS NFR BLD AUTO: 54.6 %
NITRITE URINE: NEGATIVE
PH URINE: 5.5
PLATELET # BLD AUTO: 286 K/UL
POTASSIUM SERPL-SCNC: 4.4 MMOL/L
PROT SERPL-MCNC: 7.2 G/DL
PROTEIN URINE: NEGATIVE
RBC # BLD: 4.17 M/UL
RBC # FLD: 12.4 %
RED BLOOD CELLS URINE: 7 /HPF
SODIUM SERPL-SCNC: 142 MMOL/L
SPECIFIC GRAVITY URINE: 1.02
SQUAMOUS EPITHELIAL CELLS: 4 /HPF
T3 SERPL-MCNC: 110 NG/DL
T4 FREE SERPL-MCNC: 1.1 NG/DL
T4 SERPL-MCNC: 7.1 UG/DL
TRIGL SERPL-MCNC: 54 MG/DL
TSH SERPL-ACNC: 0.98 UIU/ML
UROBILINOGEN URINE: NORMAL
WBC # FLD AUTO: 6.46 K/UL
WHITE BLOOD CELLS URINE: 2 /HPF

## 2019-10-06 NOTE — PHYSICAL EXAM
[No Acute Distress] : no acute distress [Normal Sclera/Conjunctiva] : normal sclera/conjunctiva [Well Nourished] : well nourished [Well-Appearing] : well-appearing [Well Developed] : well developed [EOMI] : extraocular movements intact [Normal Outer Ear/Nose] : the outer ears and nose were normal in appearance [PERRL] : pupils equal round and reactive to light [No Lymphadenopathy] : no lymphadenopathy [No JVD] : no jugular venous distention [Normal Oropharynx] : the oropharynx was normal [No Respiratory Distress] : no respiratory distress  [Supple] : supple [No Accessory Muscle Use] : no accessory muscle use [Thyroid Normal, No Nodules] : the thyroid was normal and there were no nodules present [Regular Rhythm] : with a regular rhythm [Clear to Auscultation] : lungs were clear to auscultation bilaterally [Normal Rate] : normal rate  [No Carotid Bruits] : no carotid bruits [No Abdominal Bruit] : a ~M bruit was not heard ~T in the abdomen [No Murmur] : no murmur heard [Normal S1, S2] : normal S1 and S2 [No Edema] : there was no peripheral edema [Pedal Pulses Present] : the pedal pulses are present [No Varicosities] : no varicosities [Normal Appearance] : normal in appearance [No Palpable Aorta] : no palpable aorta [No Extremity Clubbing/Cyanosis] : no extremity clubbing/cyanosis [Soft] : abdomen soft [No Axillary Lymphadenopathy] : no axillary lymphadenopathy [No Masses] : no abdominal mass palpated [Non Tender] : non-tender [Non-distended] : non-distended [No HSM] : no HSM [Normal Posterior Cervical Nodes] : no posterior cervical lymphadenopathy [Normal Bowel Sounds] : normal bowel sounds [No CVA Tenderness] : no CVA  tenderness [No Joint Swelling] : no joint swelling [Normal Anterior Cervical Nodes] : no anterior cervical lymphadenopathy [No Spinal Tenderness] : no spinal tenderness [Coordination Grossly Intact] : coordination grossly intact [No Rash] : no rash [Grossly Normal Strength/Tone] : grossly normal strength/tone [No Focal Deficits] : no focal deficits [Deep Tendon Reflexes (DTR)] : deep tendon reflexes were 2+ and symmetric [Normal Gait] : normal gait [Normal Affect] : the affect was normal [de-identified] : Engorged  [Normal Insight/Judgement] : insight and judgment were intact [FreeTextEntry1] : Deferred

## 2019-10-06 NOTE — HEALTH RISK ASSESSMENT
[Good] : ~his/her~  mood as  good [] : No [No] : In the past 12 months have you used drugs other than those required for medical reasons? No [No falls in past year] : Patient reported no falls in the past year [0] : 2) Feeling down, depressed, or hopeless: Not at all (0)

## 2019-10-06 NOTE — HISTORY OF PRESENT ILLNESS
[FreeTextEntry1] : Physical examination.  [de-identified] : Patient is a 39 year old female who is here for a physical examination. She is breastfeeding. She offers no specific complaints.

## 2019-10-10 ENCOUNTER — APPOINTMENT (OUTPATIENT)
Dept: INTERNAL MEDICINE | Facility: CLINIC | Age: 39
End: 2019-10-10

## 2019-11-14 ENCOUNTER — APPOINTMENT (OUTPATIENT)
Dept: INTERNAL MEDICINE | Facility: CLINIC | Age: 39
End: 2019-11-14

## 2019-12-19 ENCOUNTER — APPOINTMENT (OUTPATIENT)
Dept: INTERNAL MEDICINE | Facility: CLINIC | Age: 39
End: 2019-12-19

## 2020-04-08 RX ORDER — ALPRAZOLAM 0.25 MG/1
0.25 TABLET ORAL TWICE DAILY
Qty: 30 | Refills: 0 | Status: ACTIVE | COMMUNITY
Start: 2020-04-08 | End: 1900-01-01

## 2020-05-11 ENCOUNTER — EMERGENCY (EMERGENCY)
Facility: HOSPITAL | Age: 40
LOS: 1 days | Discharge: HOME | End: 2020-05-11
Attending: EMERGENCY MEDICINE
Payer: COMMERCIAL

## 2020-05-11 ENCOUNTER — APPOINTMENT (OUTPATIENT)
Dept: INTERNAL MEDICINE | Facility: CLINIC | Age: 40
End: 2020-05-11
Payer: COMMERCIAL

## 2020-05-11 VITALS
DIASTOLIC BLOOD PRESSURE: 78 MMHG | WEIGHT: 175.05 LBS | HEART RATE: 100 BPM | HEIGHT: 67 IN | RESPIRATION RATE: 18 BRPM | SYSTOLIC BLOOD PRESSURE: 125 MMHG | TEMPERATURE: 98 F | OXYGEN SATURATION: 100 %

## 2020-05-11 VITALS
HEART RATE: 124 BPM | OXYGEN SATURATION: 97 % | RESPIRATION RATE: 15 BRPM | HEIGHT: 66 IN | TEMPERATURE: 99.6 F | DIASTOLIC BLOOD PRESSURE: 81 MMHG | SYSTOLIC BLOOD PRESSURE: 123 MMHG | WEIGHT: 180 LBS | BODY MASS INDEX: 28.93 KG/M2

## 2020-05-11 VITALS
DIASTOLIC BLOOD PRESSURE: 75 MMHG | TEMPERATURE: 99 F | HEIGHT: 67 IN | RESPIRATION RATE: 20 BRPM | WEIGHT: 175.05 LBS | SYSTOLIC BLOOD PRESSURE: 131 MMHG | HEART RATE: 130 BPM | OXYGEN SATURATION: 99 %

## 2020-05-11 VITALS
RESPIRATION RATE: 18 BRPM | OXYGEN SATURATION: 99 % | HEART RATE: 88 BPM | DIASTOLIC BLOOD PRESSURE: 77 MMHG | SYSTOLIC BLOOD PRESSURE: 128 MMHG

## 2020-05-11 DIAGNOSIS — R10.11 RIGHT UPPER QUADRANT PAIN: ICD-10-CM

## 2020-05-11 DIAGNOSIS — R10.13 EPIGASTRIC PAIN: ICD-10-CM

## 2020-05-11 DIAGNOSIS — K29.00 ACUTE GASTRITIS W/OUT BLEEDING: ICD-10-CM

## 2020-05-11 DIAGNOSIS — R87.89 OTHER ABNORMAL FINDINGS IN SPECIMENS FROM FEMALE GENITAL ORGANS: Chronic | ICD-10-CM

## 2020-05-11 DIAGNOSIS — R10.9 UNSPECIFIED ABDOMINAL PAIN: ICD-10-CM

## 2020-05-11 LAB
ALBUMIN SERPL ELPH-MCNC: 4.3 G/DL — SIGNIFICANT CHANGE UP (ref 3.3–5)
ALP SERPL-CCNC: 84 U/L — SIGNIFICANT CHANGE UP (ref 40–120)
ALT FLD-CCNC: 13 U/L — SIGNIFICANT CHANGE UP (ref 10–45)
ANION GAP SERPL CALC-SCNC: 15 MMOL/L — SIGNIFICANT CHANGE UP (ref 5–17)
APPEARANCE UR: ABNORMAL
AST SERPL-CCNC: 11 U/L — SIGNIFICANT CHANGE UP (ref 10–40)
BACTERIA # UR AUTO: ABNORMAL
BASOPHILS # BLD AUTO: 0.05 K/UL — SIGNIFICANT CHANGE UP (ref 0–0.2)
BASOPHILS NFR BLD AUTO: 0.3 % — SIGNIFICANT CHANGE UP (ref 0–2)
BILIRUB SERPL-MCNC: 0.9 MG/DL — SIGNIFICANT CHANGE UP (ref 0.2–1.2)
BILIRUB UR-MCNC: NEGATIVE — SIGNIFICANT CHANGE UP
BUN SERPL-MCNC: 6 MG/DL — LOW (ref 7–23)
CALCIUM SERPL-MCNC: 9.4 MG/DL — SIGNIFICANT CHANGE UP (ref 8.4–10.5)
CHLORIDE SERPL-SCNC: 100 MMOL/L — SIGNIFICANT CHANGE UP (ref 96–108)
CO2 SERPL-SCNC: 22 MMOL/L — SIGNIFICANT CHANGE UP (ref 22–31)
COLOR SPEC: YELLOW — SIGNIFICANT CHANGE UP
CREAT SERPL-MCNC: 0.57 MG/DL — SIGNIFICANT CHANGE UP (ref 0.5–1.3)
DIFF PNL FLD: ABNORMAL
EOSINOPHIL # BLD AUTO: 0.05 K/UL — SIGNIFICANT CHANGE UP (ref 0–0.5)
EOSINOPHIL NFR BLD AUTO: 0.3 % — SIGNIFICANT CHANGE UP (ref 0–6)
EPI CELLS # UR: 15 — SIGNIFICANT CHANGE UP
GLUCOSE SERPL-MCNC: 95 MG/DL — SIGNIFICANT CHANGE UP (ref 70–99)
GLUCOSE UR QL: NEGATIVE — SIGNIFICANT CHANGE UP
HCG SERPL-ACNC: <2 MIU/ML — SIGNIFICANT CHANGE UP
HCT VFR BLD CALC: 35.6 % — SIGNIFICANT CHANGE UP (ref 34.5–45)
HGB BLD-MCNC: 11.3 G/DL — LOW (ref 11.5–15.5)
HYALINE CASTS # UR AUTO: 2 /LPF — SIGNIFICANT CHANGE UP (ref 0–7)
IMM GRANULOCYTES NFR BLD AUTO: 0.3 % — SIGNIFICANT CHANGE UP (ref 0–1.5)
KETONES UR-MCNC: ABNORMAL
LEUKOCYTE ESTERASE UR-ACNC: ABNORMAL
LIDOCAIN IGE QN: 18 U/L — SIGNIFICANT CHANGE UP (ref 7–60)
LYMPHOCYTES # BLD AUTO: 1.96 K/UL — SIGNIFICANT CHANGE UP (ref 1–3.3)
LYMPHOCYTES # BLD AUTO: 13.4 % — SIGNIFICANT CHANGE UP (ref 13–44)
MCHC RBC-ENTMCNC: 29.1 PG — SIGNIFICANT CHANGE UP (ref 27–34)
MCHC RBC-ENTMCNC: 31.7 GM/DL — LOW (ref 32–36)
MCV RBC AUTO: 91.8 FL — SIGNIFICANT CHANGE UP (ref 80–100)
MONOCYTES # BLD AUTO: 1.16 K/UL — HIGH (ref 0–0.9)
MONOCYTES NFR BLD AUTO: 7.9 % — SIGNIFICANT CHANGE UP (ref 2–14)
NEUTROPHILS # BLD AUTO: 11.4 K/UL — HIGH (ref 1.8–7.4)
NEUTROPHILS NFR BLD AUTO: 77.8 % — HIGH (ref 43–77)
NITRITE UR-MCNC: NEGATIVE — SIGNIFICANT CHANGE UP
NRBC # BLD: 0 /100 WBCS — SIGNIFICANT CHANGE UP (ref 0–0)
PH UR: 6 — SIGNIFICANT CHANGE UP (ref 5–8)
PLATELET # BLD AUTO: 239 K/UL — SIGNIFICANT CHANGE UP (ref 150–400)
POTASSIUM SERPL-MCNC: 4 MMOL/L — SIGNIFICANT CHANGE UP (ref 3.5–5.3)
POTASSIUM SERPL-SCNC: 4 MMOL/L — SIGNIFICANT CHANGE UP (ref 3.5–5.3)
PROT SERPL-MCNC: 7.7 G/DL — SIGNIFICANT CHANGE UP (ref 6–8.3)
PROT UR-MCNC: ABNORMAL
RBC # BLD: 3.88 M/UL — SIGNIFICANT CHANGE UP (ref 3.8–5.2)
RBC # FLD: 13.3 % — SIGNIFICANT CHANGE UP (ref 10.3–14.5)
RBC CASTS # UR COMP ASSIST: 22 /HPF — HIGH (ref 0–4)
SARS-COV-2 RNA SPEC QL NAA+PROBE: SIGNIFICANT CHANGE UP
SODIUM SERPL-SCNC: 137 MMOL/L — SIGNIFICANT CHANGE UP (ref 135–145)
SP GR SPEC: 1.01 — SIGNIFICANT CHANGE UP (ref 1.01–1.02)
UROBILINOGEN FLD QL: NEGATIVE — SIGNIFICANT CHANGE UP
WBC # BLD: 14.67 K/UL — HIGH (ref 3.8–10.5)
WBC # FLD AUTO: 14.67 K/UL — HIGH (ref 3.8–10.5)
WBC UR QL: 14 /HPF — HIGH (ref 0–5)

## 2020-05-11 PROCEDURE — 85027 COMPLETE CBC AUTOMATED: CPT

## 2020-05-11 PROCEDURE — 99284 EMERGENCY DEPT VISIT MOD MDM: CPT | Mod: 25

## 2020-05-11 PROCEDURE — 71045 X-RAY EXAM CHEST 1 VIEW: CPT

## 2020-05-11 PROCEDURE — 93005 ELECTROCARDIOGRAM TRACING: CPT

## 2020-05-11 PROCEDURE — 74177 CT ABD & PELVIS W/CONTRAST: CPT | Mod: 26

## 2020-05-11 PROCEDURE — 99285 EMERGENCY DEPT VISIT HI MDM: CPT

## 2020-05-11 PROCEDURE — 81001 URINALYSIS AUTO W/SCOPE: CPT

## 2020-05-11 PROCEDURE — 99214 OFFICE O/P EST MOD 30 MIN: CPT | Mod: 25

## 2020-05-11 PROCEDURE — 71045 X-RAY EXAM CHEST 1 VIEW: CPT | Mod: 26

## 2020-05-11 PROCEDURE — 74177 CT ABD & PELVIS W/CONTRAST: CPT

## 2020-05-11 PROCEDURE — 80053 COMPREHEN METABOLIC PANEL: CPT

## 2020-05-11 PROCEDURE — 93010 ELECTROCARDIOGRAM REPORT: CPT

## 2020-05-11 PROCEDURE — 83690 ASSAY OF LIPASE: CPT

## 2020-05-11 PROCEDURE — 84702 CHORIONIC GONADOTROPIN TEST: CPT

## 2020-05-11 PROCEDURE — 36415 COLL VENOUS BLD VENIPUNCTURE: CPT

## 2020-05-11 RX ORDER — CIPROFLOXACIN LACTATE 400MG/40ML
500 VIAL (ML) INTRAVENOUS ONCE
Refills: 0 | Status: COMPLETED | OUTPATIENT
Start: 2020-05-11 | End: 2020-05-11

## 2020-05-11 RX ORDER — METRONIDAZOLE 500 MG
1 TABLET ORAL
Qty: 42 | Refills: 0
Start: 2020-05-11 | End: 2020-05-24

## 2020-05-11 RX ORDER — ONDANSETRON 8 MG/1
4 TABLET, FILM COATED ORAL ONCE
Refills: 0 | Status: COMPLETED | OUTPATIENT
Start: 2020-05-11 | End: 2020-05-11

## 2020-05-11 RX ORDER — MOXIFLOXACIN HYDROCHLORIDE TABLETS, 400 MG 400 MG/1
1 TABLET, FILM COATED ORAL
Qty: 28 | Refills: 0
Start: 2020-05-11 | End: 2020-05-24

## 2020-05-11 RX ORDER — SODIUM CHLORIDE 9 MG/ML
1000 INJECTION INTRAMUSCULAR; INTRAVENOUS; SUBCUTANEOUS ONCE
Refills: 0 | Status: COMPLETED | OUTPATIENT
Start: 2020-05-11 | End: 2020-05-11

## 2020-05-11 RX ORDER — OMEPRAZOLE 40 MG/1
40 CAPSULE, DELAYED RELEASE ORAL
Qty: 30 | Refills: 3 | Status: ACTIVE | COMMUNITY
Start: 2020-05-11 | End: 1900-01-01

## 2020-05-11 RX ORDER — EPINEPHRINE 0.3 MG/.3ML
0.3 INJECTION INTRAMUSCULAR; SUBCUTANEOUS
Qty: 2 | Refills: 0
Start: 2020-05-11

## 2020-05-11 RX ORDER — METRONIDAZOLE 500 MG
500 TABLET ORAL ONCE
Refills: 0 | Status: COMPLETED | OUTPATIENT
Start: 2020-05-11 | End: 2020-05-11

## 2020-05-11 RX ADMIN — Medication 1 TABLET(S): at 22:58

## 2020-05-11 RX ADMIN — Medication 500 MILLIGRAM(S): at 21:12

## 2020-05-11 RX ADMIN — SODIUM CHLORIDE 1000 MILLILITER(S): 9 INJECTION INTRAMUSCULAR; INTRAVENOUS; SUBCUTANEOUS at 18:43

## 2020-05-11 NOTE — ED PROVIDER NOTE - OBJECTIVE STATEMENT
39 y/o F with no pmhx presents with epigastric pain beginning yesterday afternoon described as constant sharp pain with pressure. Pt went to PMD today and was referred to ED for further evaluation due to elevated wbc count of 13.6. Pain is currently 4/10 but worsened with movement. +nausea, diarrhea, cramping, or decrease appetite. Denies vomiting, or burning with urination. Had a baby last year with no complications. 41 y/o F with no pmhx presents with epigastric pain beginning yesterday afternoon described as constan,t sharp pain, worse with pressure, radiates to RUQ. Pt went to PMD today and was referred to ED for further evaluation due to elevated wbc count of 13.6. Pain is currently 4/10 but worsened with movement. +nausea, diarrhea x 1 today, abd cramping, and decrease appetite. Also sent for outpatient abd ultrasound today and told normal. Took omeprazole rx'ed by PCP with some relief. Denies fever, vomiting, burning with urination, h/o abdominal surgeries. Had a baby last year with no complications. 41 y/o F with no pmhx presents with epigastric pain beginning yesterday afternoon described as constan,t sharp pain, worse with pressure, radiates to RUQ. Pt went to PMD today and was referred to ED for further evaluation due to elevated wbc count of 13.6. Pain is currently 4/10 but worsened with movement. +nausea, diarrhea x 1 today, abd cramping, and decrease appetite. Also sent for outpatient abd ultrasound today and told normal. Took omeprazole rx'ed by PCP with some relief. Denies fever, vomiting, burning with urination, h/o abdominal surgeries. Had a baby last year with no complications. Currently menstruating.

## 2020-05-11 NOTE — ED ADULT TRIAGE NOTE - CHIEF COMPLAINT QUOTE
abdominal painX 2 days with one episode non bloody diarrhea today. Reports "mild" fever at home. No N+V, no cough

## 2020-05-11 NOTE — ED PROVIDER NOTE - CROS ED GU ALL NEG
restart EN when medically feasible.  If unable to tolerate, may consider feeding J-tube or PN negative...

## 2020-05-11 NOTE — ED ADULT NURSE NOTE - OBJECTIVE STATEMENT
pt was seen earlier for abd pain and diagnosed with diverticulosis and given cipro and flagyl  she returnes with swollen lips and a rash on her back that is resolving.   placed on CRM  no voice change and no respiratory distress  pt has taken flagyl before with no reaction

## 2020-05-11 NOTE — ED PROVIDER NOTE - PHYSICAL EXAMINATION
GEN: NAD, awake, alert  HEENT: NCAT, MMM  CHEST/LUNGS: clear to ascultation, bilateral breath sounds  CARDIAC: RRR  ABDOMEN: no scars, no cva tenderness. Mild generalized tenderness to right lower and upper quadrants. Moderate ttp to epigastric area. No masses. +bowel sounds  MSK: No edema, no gross deformity of extremities  SKIN: No rashes, no petechiae, no vesicles  NEURO: CN grossly intact, normal coordination, no focal motor or sensory deficits  PSYCH: Alert, appropriate, cooperative, with capacity and insight GEN: NAD, awake, alert  HEENT: NCAT, MMM  CHEST/LUNGS: clear to ascultation, bilateral breath sounds  CARDIAC: RRR  ABDOMEN: no scars, no cva tenderness. Mild generalized tenderness to right lower and upper quadrants. Moderate ttp to epigastric area. No rebound tenderness. No masses. +bowel sounds  MSK: No edema, no gross deformity of extremities. No CVA tenderness.   SKIN: No rashes, no petechiae, no vesicles  NEURO: CN grossly intact, normal coordination, no focal motor or sensory deficits  PSYCH: Alert, appropriate, cooperative, with capacity and insight

## 2020-05-11 NOTE — ED ADULT TRIAGE NOTE - CHIEF COMPLAINT QUOTE
patient c/o allergic reaction. Patient reports hives and swelling of lips. No respiratory distress or SOB. Patient given cipro and flagyl and also received IV contrast here in ED today

## 2020-05-11 NOTE — ED PROVIDER NOTE - NSFOLLOWUPINSTRUCTIONS_ED_ALL_ED_FT
Rest. Stay well hydrated. Eat a bland diet and advance as tolerated.    Avoid dairy and avoid spicy foods.     Take antibiotics as prescribed:   Cipro 500mg every 12 hours for 14 days.   Flagyl 500mg every 8 hours for 14 days.   ***DO NOT drink alcohol while taking this medication.     Take Motrin 600mg every 8hrs for pain as needed. Take with food.    May alternate with Tylenol 650mg every 4-6 hours as needed.      Follow up with your primary care provider upon discharge and be sure to follow up with Gastroenterologist for outpatient colonoscopy and further evaluation.     Bring copy of printed results with you.      Return to ER for any fever/chills, new/worsening abdominal pain, vomiting, inability to take antibiotics or tolerate food/fluid,  persistent or bloody diarrhea, or any other concerning symptoms.      ----------------------------------------------------------      Diverticulitis    WHAT YOU NEED TO KNOW:    Diverticulitis is a condition that causes small pockets along your intestine called diverticula to become inflamed or infected. This is caused by hard bowel movements, food, or bacteria that get stuck in the pockets.    DISCHARGE INSTRUCTIONS:    Seek care immediately if:     You have bowel movement or foul-smelling discharge leaking from your vagina or in your urine.      You have severe diarrhea.      You urinate less than usual or not at all.      You are not able to have a bowel movement.      You cannot stop vomiting.       You have severe abdominal pain, a fever, and your abdomen is larger than usual.       You have new or increased blood in your bowel movements.     Contact your healthcare provider if:     You have pain when you urinate.      Your symptoms get worse or do not go away.       You have questions or concerns about your condition or care.     Medicines:     Antibiotics may be given to help prevent or treat a bacterial infection.      Prescription pain medicine may be given. Ask your healthcare provider how to take this medicine safely. Some prescription pain medicines contain acetaminophen. Do not take other medicines that contain acetaminophen without talking to your healthcare provider. Too much acetaminophen may cause liver damage. Prescription pain medicine may cause constipation. Ask your healthcare provider how to prevent or treat constipation.       Take your medicine as directed. Contact your healthcare provider if you think your medicine is not helping or if you have side effects. Tell him or her if you are allergic to any medicine. Keep a list of the medicines, vitamins, and herbs you take. Include the amounts, and when and why you take them. Bring the list or the pill bottles to follow-up visits. Carry your medicine list with you in case of an emergency.    Clear liquid diet: A clear liquid diet includes any liquids that you can see through. Examples include water, ginger-ray, cranberry or apple juice, frozen fruit ice, or broth. Stay on a clear liquid diet until your symptoms are gone, or as directed.    Follow up with your healthcare provider as directed: You may need to return for a colonoscopy. When your symptoms are gone, you may need a low-fat, high-fiber diet to help prevent diverticulitis from developing again. Your healthcare provider or dietitian can help you create meal plans. Write down your questions so you remember to ask them during your visits.

## 2020-05-11 NOTE — HISTORY OF PRESENT ILLNESS
[FreeTextEntry8] : 41 YO WF CAME TO THE OFFICE COMPLAINING OF STOMACH UPSET ,EPIGASTRIC AND RUQ DISCOMFORT LAST NIGHT ,AFTER EATING A HAMBURGER .NO VOMITING ,NO DIARRHEA.   FEELING BETTER TODAY

## 2020-05-11 NOTE — ED PROVIDER NOTE - CLINICAL SUMMARY MEDICAL DECISION MAKING FREE TEXT BOX
39 y/o adult female with 1 day hx of abd pain, nausea, and 1 episode of diarrhea with elevated white count sent in from PMD for further evaluation. Will r/o pancreatitis vs appendicitis. Will obtain ct scan, labs, and reassess.

## 2020-05-11 NOTE — ED PROVIDER NOTE - PHYSICAL EXAMINATION
GEN: NAD, well developed, well nourished.   HEENT: NCAT  CHEST/LUNGS: Non-tachypneic, CTAB, bilateral breath sounds  CARDIAC: RRR  SKIN: faint urticaria on back and neck. No lip swelling  NEURO: CN grossly intact, normal coordination, no focal motor or sensory deficits  PSYCH: Alert, appropriate, cooperative, with capacity and insight GEN: NAD, well developed, well nourished.   HEENT: NCAT, throat clear  CHEST/LUNGS: Non-tachypneic, CTAB, bilateral breath sounds, no wheezing, no stridor  CARDIAC: RRR  SKIN: faint urticaria on back and neck. No lip swelling  NEURO: CN grossly intact, normal coordination, no focal motor or sensory deficits  PSYCH: Alert, appropriate, cooperative, with capacity and insight GEN: NAD, well developed, well nourished.   HEENT: NCAT, throat clear  CHEST/LUNGS: Non-tachypneic, CTAB, bilateral breath sounds, no wheezing, no stridor  CARDIAC: RRR  ABD: soft, non-tender  SKIN: faint urticaria on back and neck. No lip swelling  NEURO: CN grossly intact, normal coordination, no focal motor or sensory deficits  PSYCH: Alert, appropriate, cooperative, with capacity and insight

## 2020-05-11 NOTE — ED ADULT NURSE NOTE - NSIMPLEMENTINTERV_GEN_ALL_ED
Implemented All Universal Safety Interventions:  [place] to call system. Call bell, personal items and telephone within reach. Instruct patient to call for assistance. Room bathroom lighting operational. Non-slip footwear when patient is off stretcher. Physically safe environment: no spills, clutter or unnecessary equipment. Stretcher in lowest position, wheels locked, appropriate side rails in place.

## 2020-05-11 NOTE — ED PROVIDER NOTE - NSFOLLOWUPCLINICS_GEN_ALL_ED_FT
[facility] Allergy and Immunology  Allergy  [address] [place] [address]  Phone: [phone]  Fax:     [facility] Gastroenterology  Gastroenterology  [address]  [place] [address]  Phone: [phone]  Fax:   Follow Up Time:

## 2020-05-11 NOTE — ED PROVIDER NOTE - PROGRESS NOTE DETAILS
Attending Benji:  received s/o from Dr. Weaver. 39 y/o f took cipro/flagyl for dx of diverticulitis, developed hives. no airway symptoms reported. no sig signs of sig allergic reaction, no anaphylaxis. plan to switch to augmentin. Observe for two hours if no worsening of symptoms likely dc home augmentin

## 2020-05-11 NOTE — PHYSICAL EXAM
[No Acute Distress] : no acute distress [Well Nourished] : well nourished [Well Developed] : well developed [Normal Sclera/Conjunctiva] : normal sclera/conjunctiva [Well-Appearing] : well-appearing [PERRL] : pupils equal round and reactive to light [EOMI] : extraocular movements intact [Normal Outer Ear/Nose] : the outer ears and nose were normal in appearance [Normal Oropharynx] : the oropharynx was normal [No JVD] : no jugular venous distention [No Lymphadenopathy] : no lymphadenopathy [Supple] : supple [Thyroid Normal, No Nodules] : the thyroid was normal and there were no nodules present [No Respiratory Distress] : no respiratory distress  [Normal Rate] : normal rate  [Clear to Auscultation] : lungs were clear to auscultation bilaterally [No Accessory Muscle Use] : no accessory muscle use [Regular Rhythm] : with a regular rhythm [Normal S1, S2] : normal S1 and S2 [No Carotid Bruits] : no carotid bruits [No Murmur] : no murmur heard [No Varicosities] : no varicosities [No Abdominal Bruit] : a ~M bruit was not heard ~T in the abdomen [Pedal Pulses Present] : the pedal pulses are present [No Edema] : there was no peripheral edema [No Extremity Clubbing/Cyanosis] : no extremity clubbing/cyanosis [No Palpable Aorta] : no palpable aorta [Soft] : abdomen soft [No Masses] : no abdominal mass palpated [Non-distended] : non-distended [No HSM] : no HSM [Normal Posterior Cervical Nodes] : no posterior cervical lymphadenopathy [Normal Bowel Sounds] : normal bowel sounds [Normal Anterior Cervical Nodes] : no anterior cervical lymphadenopathy [No Joint Swelling] : no joint swelling [No CVA Tenderness] : no CVA  tenderness [No Spinal Tenderness] : no spinal tenderness [Grossly Normal Strength/Tone] : grossly normal strength/tone [Coordination Grossly Intact] : coordination grossly intact [No Rash] : no rash [No Focal Deficits] : no focal deficits [Normal Gait] : normal gait [Deep Tendon Reflexes (DTR)] : deep tendon reflexes were 2+ and symmetric [Normal Affect] : the affect was normal [de-identified] : MILD EPIGASTRIC TENDERNESS,RUQ TENDERNESS TO DEEP PALPATION [Normal Insight/Judgement] : insight and judgment were intact

## 2020-05-11 NOTE — ED PROVIDER NOTE - NSFOLLOWUPCLINICS_GEN_ALL_ED_FT
[facility] Gastroenterology  Gastroenterology  [address] [place] [id number]  Phone: [phone]  Fax:   Follow Up Time: 4-6 Days

## 2020-05-11 NOTE — ED PROVIDER NOTE - OBJECTIVE STATEMENT
41 y/o F with no hx of drug allergies presents to the ED s/p allergic reaction 2/2 Cipro and flagyl. Pt was here hours ago for diverticulitis tx. + lip swelling, itchiness on neck and back. sx has improved and almost gone. 41 y/o F with no hx of drug allergies presents to the ED s/p allergic reaction 2/2 Cipro and flagyl. Pt was here hours ago for diverticulitis tx. + lip swelling, itchiness on neck and back. sx has improved and almost gone. Denies trouble breathing/swallowing, vomiting, tongue swelling.

## 2020-05-11 NOTE — ED PROVIDER NOTE - PATIENT PORTAL LINK FT
You can access the FollowMyHealth Patient Portal offered by [facility] by registering at the following website: [email or web address]. By joining "[facility]"’s FollowMyHealth portal, you will also be able to view your health information using other applications (apps) compatible with our system.

## 2020-05-11 NOTE — ED PROVIDER NOTE - PATIENT PORTAL LINK FT
You can access the FollowMyHealth Patient Portal offered by [facility] by registering at the following website: [email or web address]. By joining [facility]’s FollowMyHealth portal, you will also be able to view your health information using other applications (apps) compatible with our system.

## 2020-05-11 NOTE — ED PROVIDER NOTE - CLINICAL SUMMARY MEDICAL DECISION MAKING FREE TEXT BOX
Adult female with diverticulitis dx this evening. Was given Cipro and flagyl presents s/p allergic reaction. Possible abx or iv contrast reaction. More likely abx. Will discharge same, start Augmentin and follow up with GI.

## 2020-05-11 NOTE — ED PROVIDER NOTE - NSFOLLOWUPINSTRUCTIONS_ED_ALL_ED_FT
DO NOT /TAKE CIPROFLOXACIN OR FLAGYL ***    Please take new antibiotics as prescribed:   Augmentin every 8 hours for 10 days.     Rest. Stay well hydrated. Eat a bland diet and advance as tolerated.    Avoid dairy and avoid spicy foods.     Take Motrin 600mg every 8hrs for pain as needed. Take with food.    May alternate with Tylenol 650mg every 4-6 hours as needed.      Follow up with your primary care provider upon discharge and be sure to follow up with Gastroenterologist for outpatient colonoscopy and further evaluation.     Bring copy of printed results with you.      Return to ER for any fever/chills, new/worsening abdominal pain, vomiting, inability to take antibiotics or tolerate food/fluid,  persistent or bloody diarrhea, or any other concerning symptoms.      ----------------------------------------------------------      Diverticulitis    WHAT YOU NEED TO KNOW:    Diverticulitis is a condition that causes small pockets along your intestine called diverticula to become inflamed or infected. This is caused by hard bowel movements, food, or bacteria that get stuck in the pockets.    DISCHARGE INSTRUCTIONS:    Seek care immediately if:     You have bowel movement or foul-smelling discharge leaking from your vagina or in your urine.      You have severe diarrhea.      You urinate less than usual or not at all.      You are not able to have a bowel movement.      You cannot stop vomiting.       You have severe abdominal pain, a fever, and your abdomen is larger than usual.       You have new or increased blood in your bowel movements.     Contact your healthcare provider if:     You have pain when you urinate.      Your symptoms get worse or do not go away.       You have questions or concerns about your condition or care.     Medicines:     Antibiotics may be given to help prevent or treat a bacterial infection.      Prescription pain medicine may be given. Ask your healthcare provider how to take this medicine safely. Some prescription pain medicines contain acetaminophen. Do not take other medicines that contain acetaminophen without talking to your healthcare provider. Too much acetaminophen may cause liver damage. Prescription pain medicine may cause constipation. Ask your healthcare provider how to prevent or treat constipation.       Take your medicine as directed. Contact your healthcare provider if you think your medicine is not helping or if you have side effects. Tell him or her if you are allergic to any medicine. Keep a list of the medicines, vitamins, and herbs you take. Include the amounts, and when and why you take them. Bring the list or the pill bottles to follow-up visits. Carry your medicine list with you in case of an emergency.    Clear liquid diet: A clear liquid diet includes any liquids that you can see through. Examples include water, ginger-ray, cranberry or apple juice, frozen fruit ice, or broth. Stay on a clear liquid diet until your symptoms are gone, or as directed.    Follow up with your healthcare provider as directed: You may need to return for a colonoscopy. When your symptoms are gone, you may need a low-fat, high-fiber diet to help prevent diverticulitis from developing again. Your healthcare provider or dietitian can help you create meal plans. Write down your questions so you remember to ask them during your visits. DO NOT /TAKE CIPROFLOXACIN OR FLAGYL ***  Please return to the ED if you have any difficulty breathing, shortness of breath, tongue swelling, or if any other concerning symptoms arise.    If you have throat tightness, shortness of breath, or severe mouth swelling use an epipen as directed.     Please take new antibiotics as prescribed:   Augmentin every 8 hours for 10 days.     Rest. Stay well hydrated. Eat a bland diet and advance as tolerated.    Avoid dairy and avoid spicy foods.     Take Motrin 600mg every 8hrs for pain as needed. Take with food.    May alternate with Tylenol 650mg every 4-6 hours as needed.      Follow up with your primary care provider upon discharge and be sure to follow up with Gastroenterologist for outpatient colonoscopy and further evaluation.     Bring copy of printed results with you.      Return to ER for any fever/chills, new/worsening abdominal pain, vomiting, inability to take antibiotics or tolerate food/fluid,  persistent or bloody diarrhea, or any other concerning symptoms.      ----------------------------------------------------------      Diverticulitis    WHAT YOU NEED TO KNOW:    Diverticulitis is a condition that causes small pockets along your intestine called diverticula to become inflamed or infected. This is caused by hard bowel movements, food, or bacteria that get stuck in the pockets.    DISCHARGE INSTRUCTIONS:    Seek care immediately if:     You have bowel movement or foul-smelling discharge leaking from your vagina or in your urine.      You have severe diarrhea.      You urinate less than usual or not at all.      You are not able to have a bowel movement.      You cannot stop vomiting.       You have severe abdominal pain, a fever, and your abdomen is larger than usual.       You have new or increased blood in your bowel movements.     Contact your healthcare provider if:     You have pain when you urinate.      Your symptoms get worse or do not go away.       You have questions or concerns about your condition or care.     Medicines:     Antibiotics may be given to help prevent or treat a bacterial infection.      Prescription pain medicine may be given. Ask your healthcare provider how to take this medicine safely. Some prescription pain medicines contain acetaminophen. Do not take other medicines that contain acetaminophen without talking to your healthcare provider. Too much acetaminophen may cause liver damage. Prescription pain medicine may cause constipation. Ask your healthcare provider how to prevent or treat constipation.       Take your medicine as directed. Contact your healthcare provider if you think your medicine is not helping or if you have side effects. Tell him or her if you are allergic to any medicine. Keep a list of the medicines, vitamins, and herbs you take. Include the amounts, and when and why you take them. Bring the list or the pill bottles to follow-up visits. Carry your medicine list with you in case of an emergency.    Clear liquid diet: A clear liquid diet includes any liquids that you can see through. Examples include water, ginger-ray, cranberry or apple juice, frozen fruit ice, or broth. Stay on a clear liquid diet until your symptoms are gone, or as directed.    Follow up with your healthcare provider as directed: You may need to return for a colonoscopy. When your symptoms are gone, you may need a low-fat, high-fiber diet to help prevent diverticulitis from developing again. Your healthcare provider or dietitian can help you create meal plans. Write down your questions so you remember to ask them during your visits.

## 2020-05-11 NOTE — ED PROVIDER NOTE - ATTENDING CONTRIBUTION TO CARE
History and Physical performed by me with scribe under my direct supervision.  BRAYDEN Weaver MD FACEP

## 2020-05-12 VITALS
RESPIRATION RATE: 16 BRPM | OXYGEN SATURATION: 99 % | SYSTOLIC BLOOD PRESSURE: 117 MMHG | HEART RATE: 87 BPM | DIASTOLIC BLOOD PRESSURE: 82 MMHG

## 2020-05-22 ENCOUNTER — APPOINTMENT (OUTPATIENT)
Dept: INTERNAL MEDICINE | Facility: CLINIC | Age: 40
End: 2020-05-22
Payer: COMMERCIAL

## 2020-05-22 ENCOUNTER — TRANSCRIPTION ENCOUNTER (OUTPATIENT)
Age: 40
End: 2020-05-22

## 2020-05-22 VITALS
BODY MASS INDEX: 28.61 KG/M2 | DIASTOLIC BLOOD PRESSURE: 75 MMHG | HEIGHT: 66 IN | OXYGEN SATURATION: 97 % | SYSTOLIC BLOOD PRESSURE: 113 MMHG | WEIGHT: 178 LBS | RESPIRATION RATE: 15 BRPM | TEMPERATURE: 98.5 F | HEART RATE: 101 BPM

## 2020-05-22 DIAGNOSIS — Z01.818 ENCOUNTER FOR OTHER PREPROCEDURAL EXAMINATION: ICD-10-CM

## 2020-05-22 PROCEDURE — 99213 OFFICE O/P EST LOW 20 MIN: CPT | Mod: 25

## 2020-05-22 PROCEDURE — 36415 COLL VENOUS BLD VENIPUNCTURE: CPT

## 2020-05-25 NOTE — HEALTH RISK ASSESSMENT
[] : No [No] : In the past 12 months have you used drugs other than those required for medical reasons? No [No falls in past year] : Patient reported no falls in the past year [0] : 2) Feeling down, depressed, or hopeless: Not at all (0) [Audit-CScore] : 0 [[id number]] : 0

## 2020-05-25 NOTE — HISTORY OF PRESENT ILLNESS
[FreeTextEntry1] : f/u abdominal pain [de-identified] : 41 yo wf with recent abdominal pain, seen at the ER and found to have Diverticulitis.Given Cipro and flagyl to which developed  an allergic reaction,so she was given AUGMENTIN\par PT IS TAKING THE AUGMENTIN FOR A WEEK NOW.\par CT SCAN OF ABDOMEN , REVELED ALSO A SMALL  LEFT LOWER LUNG NODULE. PT HERE FOR F/U

## 2020-05-25 NOTE — PHYSICAL EXAM
[No Acute Distress] : no acute distress [Well Nourished] : well nourished [Well Developed] : well developed [Well-Appearing] : well-appearing [Normal Sclera/Conjunctiva] : normal sclera/conjunctiva [PERRL] : pupils equal round and reactive to light [EOMI] : extraocular movements intact [Normal Outer Ear/Nose] : the outer ears and nose were normal in appearance [Normal Oropharynx] : the oropharynx was normal [No JVD] : no jugular venous distention [No Lymphadenopathy] : no lymphadenopathy [Thyroid Normal, No Nodules] : the thyroid was normal and there were no nodules present [Supple] : supple [No Respiratory Distress] : no respiratory distress  [No Accessory Muscle Use] : no accessory muscle use [Clear to Auscultation] : lungs were clear to auscultation bilaterally [Normal Rate] : normal rate  [Regular Rhythm] : with a regular rhythm [Normal S1, S2] : normal S1 and S2 [No Murmur] : no murmur heard [No Carotid Bruits] : no carotid bruits [No Abdominal Bruit] : a ~M bruit was not heard ~T in the abdomen [No Varicosities] : no varicosities [Pedal Pulses Present] : the pedal pulses are present [No Edema] : there was no peripheral edema [No Palpable Aorta] : no palpable aorta [No Extremity Clubbing/Cyanosis] : no extremity clubbing/cyanosis [Normal Appearance] : normal in appearance [No Axillary Lymphadenopathy] : no axillary lymphadenopathy [No Nipple Discharge] : no nipple discharge [Soft] : abdomen soft [Non Tender] : non-tender [Non-distended] : non-distended [No Masses] : no abdominal mass palpated [No HSM] : no HSM [Normal Posterior Cervical Nodes] : no posterior cervical lymphadenopathy [Normal Bowel Sounds] : normal bowel sounds [Normal Anterior Cervical Nodes] : no anterior cervical lymphadenopathy [No CVA Tenderness] : no CVA  tenderness [No Spinal Tenderness] : no spinal tenderness [No Joint Swelling] : no joint swelling [Grossly Normal Strength/Tone] : grossly normal strength/tone [No Rash] : no rash [Coordination Grossly Intact] : coordination grossly intact [No Focal Deficits] : no focal deficits [Normal Gait] : normal gait [Normal Affect] : the affect was normal [Deep Tendon Reflexes (DTR)] : deep tendon reflexes were 2+ and symmetric [Normal Insight/Judgement] : insight and judgment were intact [FreeTextEntry1] : DEFERRED

## 2020-05-28 LAB
ANION GAP SERPL CALC-SCNC: 14 MMOL/L
BASOPHILS # BLD AUTO: 0.04 K/UL
BASOPHILS NFR BLD AUTO: 0.4 %
BUN SERPL-MCNC: 9 MG/DL
CALCIUM SERPL-MCNC: 9.3 MG/DL
CHLORIDE SERPL-SCNC: 103 MMOL/L
CO2 SERPL-SCNC: 23 MMOL/L
CREAT SERPL-MCNC: 0.52 MG/DL
EOSINOPHIL # BLD AUTO: 0.13 K/UL
EOSINOPHIL NFR BLD AUTO: 1.4 %
GLUCOSE SERPL-MCNC: 81 MG/DL
HCG SERPL-MCNC: <1 MIU/ML
HCT VFR BLD CALC: 38.6 %
HGB BLD-MCNC: 12.1 G/DL
IMM GRANULOCYTES NFR BLD AUTO: 0.3 %
LYMPHOCYTES # BLD AUTO: 2.56 K/UL
LYMPHOCYTES NFR BLD AUTO: 27 %
MAN DIFF?: NORMAL
MCHC RBC-ENTMCNC: 28.9 PG
MCHC RBC-ENTMCNC: 31.3 GM/DL
MCV RBC AUTO: 92.1 FL
MONOCYTES # BLD AUTO: 0.58 K/UL
MONOCYTES NFR BLD AUTO: 6.1 %
NEUTROPHILS # BLD AUTO: 6.13 K/UL
NEUTROPHILS NFR BLD AUTO: 64.8 %
PLATELET # BLD AUTO: 330 K/UL
POTASSIUM SERPL-SCNC: 4.2 MMOL/L
RBC # BLD: 4.19 M/UL
RBC # FLD: 13 %
SODIUM SERPL-SCNC: 139 MMOL/L
WBC # FLD AUTO: 9.47 K/UL

## 2020-06-11 ENCOUNTER — APPOINTMENT (OUTPATIENT)
Dept: GASTROENTEROLOGY | Facility: CLINIC | Age: 40
End: 2020-06-11

## 2020-06-22 ENCOUNTER — APPOINTMENT (OUTPATIENT)
Dept: GASTROENTEROLOGY | Facility: CLINIC | Age: 40
End: 2020-06-22

## 2020-06-25 ENCOUNTER — APPOINTMENT (OUTPATIENT)
Dept: PULMONOLOGY | Facility: CLINIC | Age: 40
End: 2020-06-25
Payer: COMMERCIAL

## 2020-06-25 VITALS
WEIGHT: 182 LBS | HEART RATE: 89 BPM | DIASTOLIC BLOOD PRESSURE: 80 MMHG | SYSTOLIC BLOOD PRESSURE: 126 MMHG | HEIGHT: 66 IN | OXYGEN SATURATION: 99 % | RESPIRATION RATE: 12 BRPM | TEMPERATURE: 99 F | BODY MASS INDEX: 29.25 KG/M2

## 2020-06-25 DIAGNOSIS — Z87.42 PERSONAL HISTORY OF OTHER DISEASES OF THE FEMALE GENITAL TRACT: ICD-10-CM

## 2020-06-25 DIAGNOSIS — Z86.19 PERSONAL HISTORY OF OTHER INFECTIOUS AND PARASITIC DISEASES: ICD-10-CM

## 2020-06-25 DIAGNOSIS — R91.1 SOLITARY PULMONARY NODULE: ICD-10-CM

## 2020-06-25 DIAGNOSIS — Z81.8 FAMILY HISTORY OF OTHER MENTAL AND BEHAVIORAL DISORDERS: ICD-10-CM

## 2020-06-25 PROCEDURE — 94060 EVALUATION OF WHEEZING: CPT

## 2020-06-25 PROCEDURE — 94729 DIFFUSING CAPACITY: CPT

## 2020-06-25 PROCEDURE — 95012 NITRIC OXIDE EXP GAS DETER: CPT

## 2020-06-25 PROCEDURE — 94726 PLETHYSMOGRAPHY LUNG VOLUMES: CPT

## 2020-06-25 PROCEDURE — 99244 OFF/OP CNSLTJ NEW/EST MOD 40: CPT | Mod: 25

## 2020-06-25 NOTE — ASSESSMENT
[FreeTextEntry1] : Summary the patient is a 40-year-old nonsmoking female with past medical history significant for anxiety who presents today for a pulmonary consult regarding an abnormal CT of the chest. The patient's physical exam is within normal limits. Computed axial tomography scan showed a left nodule less than 4 mm\par \par Current function test was within normal limits\par \par FeNO < 5PPB\par Had a lengthy discussion with patient regarding her computed axial tomography scan and the Fleischner Society recommendations. \par She is instructed to followup in one year for repeat computed axial tomography scan

## 2020-06-25 NOTE — HISTORY OF PRESENT ILLNESS
[Never] : never [TextBox_4] : Patient is a 40 -year-old female with past medical history significant for anxiety who was recently found to have a right pulmonary nodule on x-ray and is referred for pulmonary consultation. The patient is postpartum. She is a nonsmoker. She denies fevers chills chest pain weight loss or hemoptysis

## 2020-06-25 NOTE — PHYSICAL EXAM
[No Acute Distress] : no acute distress [Normal Appearance] : normal appearance [Normal Oropharynx] : normal oropharynx [No Neck Mass] : no neck mass [Normal S1, S2] : normal s1, s2 [Normal Rate/Rhythm] : normal rate/rhythm [No Murmurs] : no murmurs [Clear to Auscultation Bilaterally] : clear to auscultation bilaterally [No Resp Distress] : no resp distress [Normal Gait] : normal gait [No Abnormalities] : no abnormalities [Benign] : benign [No Clubbing] : no clubbing [No Cyanosis] : no cyanosis [FROM] : FROM [No Edema] : no edema [Normal Color/ Pigmentation] : normal color/ pigmentation [Oriented x3] : oriented x3 [No Focal Deficits] : no focal deficits [Normal Affect] : normal affect

## 2020-06-25 NOTE — REASON FOR VISIT
[Consultation] : a consultation [Abnormal CXR/ Chest CT] : an abnormal CXR/ chest CT [Pulmonary Nodules] : pulmonary nodules

## 2020-07-20 ENCOUNTER — APPOINTMENT (OUTPATIENT)
Dept: GASTROENTEROLOGY | Facility: CLINIC | Age: 40
End: 2020-07-20

## 2020-07-23 LAB
ALBUMIN SERPL ELPH-MCNC: 4.6 G/DL
ALP BLD-CCNC: 91 U/L
ALT SERPL-CCNC: 14 U/L
ANION GAP SERPL CALC-SCNC: 13 MMOL/L
AST SERPL-CCNC: 10 U/L
BASOPHILS # BLD AUTO: 0.06 K/UL
BASOPHILS NFR BLD AUTO: 0.4 %
BILIRUB SERPL-MCNC: 0.8 MG/DL
BUN SERPL-MCNC: 7 MG/DL
CALCIUM SERPL-MCNC: 9.2 MG/DL
CHLORIDE SERPL-SCNC: 103 MMOL/L
CHOLEST SERPL-MCNC: 161 MG/DL
CHOLEST/HDLC SERPL: 2.4 RATIO
CO2 SERPL-SCNC: 23 MMOL/L
CREAT SERPL-MCNC: 0.61 MG/DL
EOSINOPHIL # BLD AUTO: 0.04 K/UL
EOSINOPHIL NFR BLD AUTO: 0.3 %
GLUCOSE SERPL-MCNC: 88 MG/DL
HCT VFR BLD CALC: 38.5 %
HDLC SERPL-MCNC: 67 MG/DL
HGB BLD-MCNC: 12 G/DL
IMM GRANULOCYTES NFR BLD AUTO: 0.4 %
LDLC SERPL CALC-MCNC: 85 MG/DL
LYMPHOCYTES # BLD AUTO: 1.57 K/UL
LYMPHOCYTES NFR BLD AUTO: 11.5 %
MAN DIFF?: NORMAL
MCHC RBC-ENTMCNC: 29.6 PG
MCHC RBC-ENTMCNC: 31.2 GM/DL
MCV RBC AUTO: 94.8 FL
MONOCYTES # BLD AUTO: 1.13 K/UL
MONOCYTES NFR BLD AUTO: 8.3 %
NEUTROPHILS # BLD AUTO: 10.84 K/UL
NEUTROPHILS NFR BLD AUTO: 79.1 %
PLATELET # BLD AUTO: 258 K/UL
POTASSIUM SERPL-SCNC: 4 MMOL/L
PROT SERPL-MCNC: 7.2 G/DL
RBC # BLD: 4.06 M/UL
RBC # FLD: 13.6 %
SODIUM SERPL-SCNC: 139 MMOL/L
TRIGL SERPL-MCNC: 50 MG/DL
WBC # FLD AUTO: 13.69 K/UL

## 2020-08-11 ENCOUNTER — APPOINTMENT (OUTPATIENT)
Dept: GASTROENTEROLOGY | Facility: CLINIC | Age: 40
End: 2020-08-11

## 2020-10-08 ENCOUNTER — APPOINTMENT (OUTPATIENT)
Dept: HEART AND VASCULAR | Facility: CLINIC | Age: 40
End: 2020-10-08
Payer: COMMERCIAL

## 2020-10-08 ENCOUNTER — NON-APPOINTMENT (OUTPATIENT)
Age: 40
End: 2020-10-08

## 2020-10-08 ENCOUNTER — APPOINTMENT (OUTPATIENT)
Dept: INTERNAL MEDICINE | Facility: CLINIC | Age: 40
End: 2020-10-08
Payer: COMMERCIAL

## 2020-10-08 VITALS
HEART RATE: 111 BPM | HEIGHT: 66 IN | SYSTOLIC BLOOD PRESSURE: 124 MMHG | TEMPERATURE: 97.2 F | DIASTOLIC BLOOD PRESSURE: 80 MMHG | OXYGEN SATURATION: 97 % | BODY MASS INDEX: 28.61 KG/M2 | WEIGHT: 178 LBS | RESPIRATION RATE: 14 BRPM

## 2020-10-08 DIAGNOSIS — K21.9 GASTRO-ESOPHAGEAL REFLUX DISEASE W/OUT ESOPHAGITIS: ICD-10-CM

## 2020-10-08 PROCEDURE — 99213 OFFICE O/P EST LOW 20 MIN: CPT | Mod: 25

## 2020-10-08 PROCEDURE — 93000 ELECTROCARDIOGRAM COMPLETE: CPT

## 2020-10-08 PROCEDURE — 36415 COLL VENOUS BLD VENIPUNCTURE: CPT

## 2020-10-08 PROCEDURE — 99213 OFFICE O/P EST LOW 20 MIN: CPT

## 2020-10-08 RX ORDER — ALPRAZOLAM 0.25 MG/1
0.25 TABLET ORAL TWICE DAILY
Qty: 40 | Refills: 0 | Status: ACTIVE | COMMUNITY
Start: 2020-10-08 | End: 1900-01-01

## 2020-10-08 NOTE — ASSESSMENT
[FreeTextEntry1] : 39 yo F with PMH anxiety here for first evaluation for palpitations\par \par PALPITATIONS\par -echo to r/o any structural abnormality\par -holter to r/o any arrhythmia\par -f/u cmp and tsh from pmd\par \par HTN\par well controlled on no medications\par -cont with healty diet and exercise\par \par HLD\par -fasting lipid panel 7/2020: ldl 85 trg 50 hdl 67 chol 161\par -cont with healthy diet and exercise\par \par -f/u in 3 weeks for echo and holter results

## 2020-10-08 NOTE — PHYSICAL EXAM
[Normal Conjunctiva] : the conjunctiva exhibited no abnormalities [Eyelids - No Xanthelasma] : the eyelids demonstrated no xanthelasmas [Normal Oral Mucosa] : normal oral mucosa [No Oral Pallor] : no oral pallor [No Oral Cyanosis] : no oral cyanosis [Normal Jugular Venous A Waves Present] : normal jugular venous A waves present [Normal Jugular Venous V Waves Present] : normal jugular venous V waves present [No Jugular Venous Sanchez A Waves] : no jugular venous sanchez A waves [Heart Rate And Rhythm] : heart rate and rhythm were normal [Heart Sounds] : normal S1 and S2 [Murmurs] : no murmurs present [Respiration, Rhythm And Depth] : normal respiratory rhythm and effort [Exaggerated Use Of Accessory Muscles For Inspiration] : no accessory muscle use [Auscultation Breath Sounds / Voice Sounds] : lungs were clear to auscultation bilaterally [Abdomen Soft] : soft [Abdomen Tenderness] : non-tender [Abdomen Mass (___ Cm)] : no abdominal mass palpated [Skin Color & Pigmentation] : normal skin color and pigmentation [] : no rash [No Venous Stasis] : no venous stasis [Skin Lesions] : no skin lesions [No Skin Ulcers] : no skin ulcer [No Xanthoma] : no  xanthoma was observed

## 2020-10-08 NOTE — HISTORY OF PRESENT ILLNESS
[FreeTextEntry1] : 39 yo F with PMH anxiety here for first evaluation for palpitations\par The patient reports palpitations mostly at night, lasting few seconds, described as heart beating fast, self resolving. \par The patient reports symptoms a/w anxiety. \par Denies chest pain, shortness of breath, palpitations, syncope, presyncope, LE edema, orthopnea. \par Reports excellent exercise tolerance\par \par PMH\par none\par \par PSH\par none\par \par ALL\par NKDA\par \par MEDS\par alprazolam\par \par FH\par mother HTN\par \par SH\par social etoh, no smoke, no drugs\par \par \par EKG 10/8/2020: SR, wnl\par \par

## 2020-10-09 LAB
25(OH)D3 SERPL-MCNC: 31.6 NG/ML
ALBUMIN SERPL ELPH-MCNC: 4.9 G/DL
ALP BLD-CCNC: 83 U/L
ALT SERPL-CCNC: 24 U/L
ANION GAP SERPL CALC-SCNC: 21 MMOL/L
APPEARANCE: CLEAR
AST SERPL-CCNC: 25 U/L
BACTERIA: ABNORMAL
BASOPHILS # BLD AUTO: 0.07 K/UL
BASOPHILS NFR BLD AUTO: 0.9 %
BILIRUB SERPL-MCNC: 0.4 MG/DL
BILIRUBIN URINE: NEGATIVE
BLOOD URINE: NEGATIVE
BUN SERPL-MCNC: 11 MG/DL
CALCIUM SERPL-MCNC: 9.6 MG/DL
CHLORIDE SERPL-SCNC: 103 MMOL/L
CHOLEST SERPL-MCNC: 162 MG/DL
CHOLEST/HDLC SERPL: 3 RATIO
CO2 SERPL-SCNC: 16 MMOL/L
COLOR: YELLOW
CREAT SERPL-MCNC: 0.59 MG/DL
EOSINOPHIL # BLD AUTO: 0.16 K/UL
EOSINOPHIL NFR BLD AUTO: 2 %
GLUCOSE QUALITATIVE U: NEGATIVE
GLUCOSE SERPL-MCNC: 67 MG/DL
HCT VFR BLD CALC: 41.8 %
HDLC SERPL-MCNC: 53 MG/DL
HGB BLD-MCNC: 12.8 G/DL
HYALINE CASTS: 0 /LPF
IMM GRANULOCYTES NFR BLD AUTO: 0.3 %
KETONES URINE: NEGATIVE
LDLC SERPL CALC-MCNC: 98 MG/DL
LEUKOCYTE ESTERASE URINE: NEGATIVE
LYMPHOCYTES # BLD AUTO: 2.47 K/UL
LYMPHOCYTES NFR BLD AUTO: 30.9 %
MAN DIFF?: NORMAL
MCHC RBC-ENTMCNC: 29.2 PG
MCHC RBC-ENTMCNC: 30.6 GM/DL
MCV RBC AUTO: 95.4 FL
MICROSCOPIC-UA: NORMAL
MONOCYTES # BLD AUTO: 0.5 K/UL
MONOCYTES NFR BLD AUTO: 6.3 %
NEUTROPHILS # BLD AUTO: 4.77 K/UL
NEUTROPHILS NFR BLD AUTO: 59.6 %
NITRITE URINE: NEGATIVE
PH URINE: 5.5
PLATELET # BLD AUTO: 292 K/UL
POTASSIUM SERPL-SCNC: 4.4 MMOL/L
PROT SERPL-MCNC: 7.4 G/DL
PROTEIN URINE: NEGATIVE
RBC # BLD: 4.38 M/UL
RBC # FLD: 13.1 %
RED BLOOD CELLS URINE: 2 /HPF
SODIUM SERPL-SCNC: 141 MMOL/L
SPECIFIC GRAVITY URINE: 1.02
SQUAMOUS EPITHELIAL CELLS: 4 /HPF
T3 SERPL-MCNC: 118 NG/DL
T4 FREE SERPL-MCNC: 1.2 NG/DL
T4 SERPL-MCNC: 7.5 UG/DL
TRIGL SERPL-MCNC: 52 MG/DL
TSH SERPL-ACNC: 0.95 UIU/ML
URINE COMMENTS: NORMAL
UROBILINOGEN URINE: NORMAL
WBC # FLD AUTO: 7.99 K/UL
WHITE BLOOD CELLS URINE: 6 /HPF

## 2020-10-11 NOTE — END OF VISIT
[FreeTextEntry3] : I, Carmen Sorto, personally transcribed these services in the presence of Dr. Faye Witt MD. I, Dr. Faye Witt MD, personally performed the services described in this documentation as scribed by Carmen Sorto in my presence and it is both accurate and complete.

## 2020-10-11 NOTE — PLAN
[FreeTextEntry1] : Blood tests sent to the lab \par \par EKG\par \par Continue Pantoprazole 40mg QD\par \par Rule diet\par \par Caridology evaluation \par

## 2020-10-11 NOTE — PHYSICAL EXAM
[No Acute Distress] : no acute distress [Well Nourished] : well nourished [Well Developed] : well developed [Well-Appearing] : well-appearing [Normal Sclera/Conjunctiva] : normal sclera/conjunctiva [PERRL] : pupils equal round and reactive to light [EOMI] : extraocular movements intact [Normal Outer Ear/Nose] : the outer ears and nose were normal in appearance [Normal Oropharynx] : the oropharynx was normal [No JVD] : no jugular venous distention [No Lymphadenopathy] : no lymphadenopathy [Supple] : supple [Thyroid Normal, No Nodules] : the thyroid was normal and there were no nodules present [No Respiratory Distress] : no respiratory distress  [No Accessory Muscle Use] : no accessory muscle use [Clear to Auscultation] : lungs were clear to auscultation bilaterally [Normal Rate] : normal rate  [Regular Rhythm] : with a regular rhythm [Normal S1, S2] : normal S1 and S2 [No Murmur] : no murmur heard [No Carotid Bruits] : no carotid bruits [No Abdominal Bruit] : a ~M bruit was not heard ~T in the abdomen [No Varicosities] : no varicosities [Pedal Pulses Present] : the pedal pulses are present [No Edema] : there was no peripheral edema [No Palpable Aorta] : no palpable aorta [No Extremity Clubbing/Cyanosis] : no extremity clubbing/cyanosis [Normal Appearance] : normal in appearance [No Nipple Discharge] : no nipple discharge [No Axillary Lymphadenopathy] : no axillary lymphadenopathy [Soft] : abdomen soft [Non Tender] : non-tender [Non-distended] : non-distended [No Masses] : no abdominal mass palpated [No HSM] : no HSM [Normal Bowel Sounds] : normal bowel sounds [Normal Posterior Cervical Nodes] : no posterior cervical lymphadenopathy [Normal Anterior Cervical Nodes] : no anterior cervical lymphadenopathy [No CVA Tenderness] : no CVA  tenderness [No Spinal Tenderness] : no spinal tenderness [No Joint Swelling] : no joint swelling [Grossly Normal Strength/Tone] : grossly normal strength/tone [No Rash] : no rash [Coordination Grossly Intact] : coordination grossly intact [No Focal Deficits] : no focal deficits [Normal Gait] : normal gait [Normal Affect] : the affect was normal [Normal Insight/Judgement] : insight and judgment were intact [de-identified] : fibrocystic breasts bilaterally

## 2020-10-11 NOTE — HISTORY OF PRESENT ILLNESS
[FreeTextEntry8] : DALIA COLON is a 40 year old female patient with a PMHx of HPV, anxiety, and diverticulitis who presents today complaining of acid reflux for two weeks and heart palpitations for three to four weeks. Patient states that palpitations occur mostly when she is lying down. She also relates frequent burping and flatulence.\par anxiety\par \par Patient recently had a mammography and breast US done; results were wnl as per patient.

## 2020-10-22 ENCOUNTER — APPOINTMENT (OUTPATIENT)
Dept: HEART AND VASCULAR | Facility: CLINIC | Age: 40
End: 2020-10-22

## 2020-12-03 ENCOUNTER — APPOINTMENT (OUTPATIENT)
Dept: HEART AND VASCULAR | Facility: CLINIC | Age: 40
End: 2020-12-03

## 2021-01-27 ENCOUNTER — RESULT REVIEW (OUTPATIENT)
Age: 41
End: 2021-01-27

## 2021-03-26 ENCOUNTER — EMERGENCY (EMERGENCY)
Facility: HOSPITAL | Age: 41
LOS: 1 days | Discharge: HOME | End: 2021-03-26
Attending: EMERGENCY MEDICINE
Payer: COMMERCIAL

## 2021-03-26 VITALS
OXYGEN SATURATION: 98 % | TEMPERATURE: 98 F | HEIGHT: 67 IN | WEIGHT: 175.05 LBS | RESPIRATION RATE: 17 BRPM | DIASTOLIC BLOOD PRESSURE: 73 MMHG | SYSTOLIC BLOOD PRESSURE: 126 MMHG | HEART RATE: 101 BPM

## 2021-03-26 DIAGNOSIS — R87.89 OTHER ABNORMAL FINDINGS IN SPECIMENS FROM FEMALE GENITAL ORGANS: Chronic | ICD-10-CM

## 2021-03-26 PROCEDURE — 99285 EMERGENCY DEPT VISIT HI MDM: CPT

## 2021-03-27 VITALS
OXYGEN SATURATION: 100 % | SYSTOLIC BLOOD PRESSURE: 115 MMHG | HEART RATE: 76 BPM | RESPIRATION RATE: 19 BRPM | DIASTOLIC BLOOD PRESSURE: 80 MMHG

## 2021-03-27 LAB
ALBUMIN SERPL ELPH-MCNC: 4.4 G/DL — SIGNIFICANT CHANGE UP (ref 3.3–5)
ALP SERPL-CCNC: 76 U/L — SIGNIFICANT CHANGE UP (ref 40–120)
ALT FLD-CCNC: 14 U/L — SIGNIFICANT CHANGE UP (ref 10–45)
ANION GAP SERPL CALC-SCNC: 13 MMOL/L — SIGNIFICANT CHANGE UP (ref 5–17)
APPEARANCE UR: CLEAR — SIGNIFICANT CHANGE UP
AST SERPL-CCNC: 13 U/L — SIGNIFICANT CHANGE UP (ref 10–40)
BASOPHILS # BLD AUTO: 0.05 K/UL — SIGNIFICANT CHANGE UP (ref 0–0.2)
BASOPHILS NFR BLD AUTO: 0.5 % — SIGNIFICANT CHANGE UP (ref 0–2)
BILIRUB SERPL-MCNC: 0.3 MG/DL — SIGNIFICANT CHANGE UP (ref 0.2–1.2)
BILIRUB UR-MCNC: NEGATIVE — SIGNIFICANT CHANGE UP
BUN SERPL-MCNC: 14 MG/DL — SIGNIFICANT CHANGE UP (ref 7–23)
CALCIUM SERPL-MCNC: 8.9 MG/DL — SIGNIFICANT CHANGE UP (ref 8.4–10.5)
CHLORIDE SERPL-SCNC: 106 MMOL/L — SIGNIFICANT CHANGE UP (ref 96–108)
CO2 SERPL-SCNC: 22 MMOL/L — SIGNIFICANT CHANGE UP (ref 22–31)
COLOR SPEC: SIGNIFICANT CHANGE UP
CREAT SERPL-MCNC: 0.53 MG/DL — SIGNIFICANT CHANGE UP (ref 0.5–1.3)
DIFF PNL FLD: NEGATIVE — SIGNIFICANT CHANGE UP
EOSINOPHIL # BLD AUTO: 0.22 K/UL — SIGNIFICANT CHANGE UP (ref 0–0.5)
EOSINOPHIL NFR BLD AUTO: 2.3 % — SIGNIFICANT CHANGE UP (ref 0–6)
GLUCOSE SERPL-MCNC: 96 MG/DL — SIGNIFICANT CHANGE UP (ref 70–99)
GLUCOSE UR QL: NEGATIVE — SIGNIFICANT CHANGE UP
HCG SERPL-ACNC: <2 MIU/ML — SIGNIFICANT CHANGE UP
HCT VFR BLD CALC: 35.3 % — SIGNIFICANT CHANGE UP (ref 34.5–45)
HGB BLD-MCNC: 11.3 G/DL — LOW (ref 11.5–15.5)
IMM GRANULOCYTES NFR BLD AUTO: 0.3 % — SIGNIFICANT CHANGE UP (ref 0–1.5)
KETONES UR-MCNC: NEGATIVE — SIGNIFICANT CHANGE UP
LEUKOCYTE ESTERASE UR-ACNC: NEGATIVE — SIGNIFICANT CHANGE UP
LIDOCAIN IGE QN: 43 U/L — SIGNIFICANT CHANGE UP (ref 7–60)
LYMPHOCYTES # BLD AUTO: 3.16 K/UL — SIGNIFICANT CHANGE UP (ref 1–3.3)
LYMPHOCYTES # BLD AUTO: 33.4 % — SIGNIFICANT CHANGE UP (ref 13–44)
MCHC RBC-ENTMCNC: 29.2 PG — SIGNIFICANT CHANGE UP (ref 27–34)
MCHC RBC-ENTMCNC: 32 GM/DL — SIGNIFICANT CHANGE UP (ref 32–36)
MCV RBC AUTO: 91.2 FL — SIGNIFICANT CHANGE UP (ref 80–100)
MONOCYTES # BLD AUTO: 0.71 K/UL — SIGNIFICANT CHANGE UP (ref 0–0.9)
MONOCYTES NFR BLD AUTO: 7.5 % — SIGNIFICANT CHANGE UP (ref 2–14)
NEUTROPHILS # BLD AUTO: 5.3 K/UL — SIGNIFICANT CHANGE UP (ref 1.8–7.4)
NEUTROPHILS NFR BLD AUTO: 56 % — SIGNIFICANT CHANGE UP (ref 43–77)
NITRITE UR-MCNC: NEGATIVE — SIGNIFICANT CHANGE UP
NRBC # BLD: 0 /100 WBCS — SIGNIFICANT CHANGE UP (ref 0–0)
PH UR: 5.5 — SIGNIFICANT CHANGE UP (ref 5–8)
PLATELET # BLD AUTO: 240 K/UL — SIGNIFICANT CHANGE UP (ref 150–400)
POTASSIUM SERPL-MCNC: 3.7 MMOL/L — SIGNIFICANT CHANGE UP (ref 3.5–5.3)
POTASSIUM SERPL-SCNC: 3.7 MMOL/L — SIGNIFICANT CHANGE UP (ref 3.5–5.3)
PROT SERPL-MCNC: 7.5 G/DL — SIGNIFICANT CHANGE UP (ref 6–8.3)
PROT UR-MCNC: NEGATIVE — SIGNIFICANT CHANGE UP
RBC # BLD: 3.87 M/UL — SIGNIFICANT CHANGE UP (ref 3.8–5.2)
RBC # FLD: 12.8 % — SIGNIFICANT CHANGE UP (ref 10.3–14.5)
SODIUM SERPL-SCNC: 141 MMOL/L — SIGNIFICANT CHANGE UP (ref 135–145)
SP GR SPEC: 1.01 — SIGNIFICANT CHANGE UP (ref 1.01–1.02)
UROBILINOGEN FLD QL: NEGATIVE — SIGNIFICANT CHANGE UP
WBC # BLD: 9.47 K/UL — SIGNIFICANT CHANGE UP (ref 3.8–10.5)
WBC # FLD AUTO: 9.47 K/UL — SIGNIFICANT CHANGE UP (ref 3.8–10.5)

## 2021-03-27 PROCEDURE — 76830 TRANSVAGINAL US NON-OB: CPT | Mod: 26

## 2021-03-27 PROCEDURE — 85025 COMPLETE CBC W/AUTO DIFF WBC: CPT

## 2021-03-27 PROCEDURE — 83690 ASSAY OF LIPASE: CPT

## 2021-03-27 PROCEDURE — 36000 PLACE NEEDLE IN VEIN: CPT

## 2021-03-27 PROCEDURE — 84702 CHORIONIC GONADOTROPIN TEST: CPT

## 2021-03-27 PROCEDURE — 74177 CT ABD & PELVIS W/CONTRAST: CPT | Mod: 26,MD

## 2021-03-27 PROCEDURE — 99284 EMERGENCY DEPT VISIT MOD MDM: CPT | Mod: 25

## 2021-03-27 PROCEDURE — 76705 ECHO EXAM OF ABDOMEN: CPT | Mod: 26

## 2021-03-27 PROCEDURE — 93975 VASCULAR STUDY: CPT | Mod: 26

## 2021-03-27 PROCEDURE — 81003 URINALYSIS AUTO W/O SCOPE: CPT

## 2021-03-27 PROCEDURE — 74177 CT ABD & PELVIS W/CONTRAST: CPT

## 2021-03-27 PROCEDURE — 76705 ECHO EXAM OF ABDOMEN: CPT

## 2021-03-27 PROCEDURE — 87086 URINE CULTURE/COLONY COUNT: CPT

## 2021-03-27 PROCEDURE — 93975 VASCULAR STUDY: CPT

## 2021-03-27 PROCEDURE — 76830 TRANSVAGINAL US NON-OB: CPT

## 2021-03-27 PROCEDURE — 80053 COMPREHEN METABOLIC PANEL: CPT

## 2021-03-27 RX ORDER — ACETAMINOPHEN 500 MG
650 TABLET ORAL ONCE
Refills: 0 | Status: COMPLETED | OUTPATIENT
Start: 2021-03-27 | End: 2021-03-27

## 2021-03-27 RX ADMIN — Medication 1 TABLET(S): at 06:36

## 2021-03-27 NOTE — ED PROVIDER NOTE - CLINICAL SUMMARY MEDICAL DECISION MAKING FREE TEXT BOX
PGY3/MD Chantell. 41 yo F, with h/o diverticulitis, p/w RUQ, GB stone vs. diverticulitis, will do bed side u/s, if no stones, will do ct abd contrast.

## 2021-03-27 NOTE — ED PROVIDER NOTE - PATIENT PORTAL LINK FT
You can access the FollowMyHealth Patient Portal offered by [facility] by registering at the following website: [email or web address]. By joining [facility]’s FollowMyHealth portal, you will also be able to view your health information using other applications (apps) compatible with our system. You can access the FollowMyHealth Patient Portal offered by [facility] by registering at the following website: [email or web address]. By joining [employer]’s FollowMyHealth portal, you will also be able to view your health information using other applications (apps) compatible with our system.

## 2021-03-27 NOTE — ED PROVIDER NOTE - ATTENDING CONTRIBUTION TO CARE
Afebrile. Awake and Alert. Lungs CTA. Heart RRR. Abdomen soft, +mild RUQ TTP and LLQ TTP without guarding or rebound, ND. CN II-XII grossly intact. Moves all extremities without lateralization.    r/o gallstones  r/o diverticulitis  r/o gastroenteritis

## 2021-03-27 NOTE — ED PROVIDER NOTE - NSFOLLOWUPCLINICS_GEN_ALL_ED_FT
An OB/GYN physician  Obstetrics & Gynecology  .  [place]   Phone:   Fax:   Follow Up Time:     [facility]  OB/GYN & Surg  [phone] [place] [id number]  Phone: [phone]  Fax:   Follow Up Time:     [facility] Gynecology and Obstetrics  Gynceology/OB  [address] [place] [address]  Phone: [phone]  Fax:   Follow Up Time:

## 2021-03-27 NOTE — ED ADULT NURSE NOTE - OBJECTIVE STATEMENT
40 year old female presents ambulatory to ED c/o intermittent abdominal pain since Wednesday. Pt has a history of diverticulitis and since Wednesday has been experiencing abdominal cramping that starts in her right upper quadrant and radiates to her epigastric area and left upper quadrant. Pt has been eating and drinking normally but does report eating less healthy foods than she has in the past increased ice cream and alcohol in her diet. She has been having more flatulence today than she had previously noted but otherwise denies chest pain, shortness of breath, headache, nausea, vomiting, diarrhaea, fever, chills, urinary symptoms, lightheadedness, dizziness, changes in vision caffeine/denies drug use

## 2021-03-27 NOTE — ED PROVIDER NOTE - NSFOLLOWUPINSTRUCTIONS_ED_ALL_ED_FT
- Augmentin twice per day  - follow up with your GI doctor    Diverticulitis    Diverticulitis is inflammation or infection of small pouches in your colon that form when you HAVE a condition called diverticulosis. This condition can range from mild to severe potentially leading to perforation or obstructions of your colon. Symptoms include abdominal pain, fever/chills, nausea, vomiting, diarrhea, constipation, or blood in your stool. If you were prescribed an antibiotic medicine, take it as told by your health care provider. Do not stop taking the antibiotic even if you start to feel better.    SEEK IMMEDIATE MEDICAL CARE IF YOU HAVE ANY OF THE FOLLOWING SYMPTOMS: worsening abdominal pain, high fever, inability to hold down liquids or medication, black or bloody stools, inability to pass gas, lightheadedness/dizziness, or a change in mental status. - Augmentin twice per day  - follow up with your GI doctor  -Please follow up with your gynecologist or with someone from the number below. Please come back to the emergency room if you start having pain in your right pelvis, coming and going, vaginal bleeding or for any other emergent concern.    Diverticulitis    Diverticulitis is inflammation or infection of small pouches in your colon that form when you HAVE a condition called diverticulosis. This condition can range from mild to severe potentially leading to perforation or obstructions of your colon. Symptoms include abdominal pain, fever/chills, nausea, vomiting, diarrhea, constipation, or blood in your stool. If you were prescribed an antibiotic medicine, take it as told by your health care provider. Do not stop taking the antibiotic even if you start to feel better.    SEEK IMMEDIATE MEDICAL CARE IF YOU HAVE ANY OF THE FOLLOWING SYMPTOMS: worsening abdominal pain, high fever, inability to hold down liquids or medication, black or bloody stools, inability to pass gas, lightheadedness/dizziness, or a change in mental status.

## 2021-03-27 NOTE — ED PROVIDER NOTE - PROGRESS NOTE DETAILS
PGY3/MD Chantell. diverticulitis, starting augmenti, pt reported her result, follow up with her GI. I have given the pt strict return and follow up precautions. The patient has been provided with a copy of all pertinent results. The patient has been informed of all concerning signs and symptoms to return to Emergency Department, the necessity to follow up with PMD/Clinic/follow up provided within 2-3 days was explained, and the patient reports understanding of above with capacity and insight. Lore MORSE: Received sign out from my colleague Dr. Miller pt presents w lower abdominal pain found to have diverticulitis on CT s/p Augmentin in ED, however found to have 3cm ovarian cyst, pending TVUS and clinical reassessment at time of sign out. Jama: Patient does not want to wait for the results of the ultrasound. Will dc with gyn follow up. Abx sent to pharmacy for diverticulitis.

## 2021-03-27 NOTE — ED PROVIDER NOTE - PHYSICAL EXAMINATION
PGY3/MD Chantell.   VITALS: reviewed  GEN: No apparent distress, A & O x 4  HEAD/EYES: NC/AT, PERRL, EOMI, anicteric sclerae, no conjunctival pallor  ENT: mucus membranes moist, oropharynx WNL, trachea midline, no JVD, neck is supple  RESP: lungs CTA with equal breath sounds bilaterally, chest wall nontender and atraumatic  CV: heart with reg rhythm S1, S2, no murmur; distal pulses intact and symmetric bilaterally  ABDOMEN: normoactive bowel sounds, soft, nondistended, nontender + mild RUQ pain  MSK: extremities atraumatic and nontender, no edema, no asymmetry.  SKIN: warm, dry, no rash, no bruising, no cyanosis. color appropriate for ethnicity  NEURO: alert, mentating appropriately, no facial asymmetry.  PSYCH: Affect appropriate

## 2021-03-27 NOTE — ED PROVIDER NOTE - OBJECTIVE STATEMENT
PGY3/MD Chantell. 39 yo F, with h/o diverticulitis, p/w RUQ pain. Started with lower quadrant pain, had the diverticulitis in the past for the same symptoms, on Wednesday, started the same RLQ pain, worsening, no improvement, now having RUQ pain. no vomit, no dark stool.

## 2021-03-28 LAB
CULTURE RESULTS: SIGNIFICANT CHANGE UP
SPECIMEN SOURCE: SIGNIFICANT CHANGE UP

## 2021-04-26 NOTE — ED ADULT TRIAGE NOTE - PATIENT ON (OXYGEN DELIVERY METHOD)
Impression: WET AMD Right eye -- h/o injx in [place] - Txfr to [facility] '20 Plan: Continue care with Dr. Radha Danielle. Stable OCT today. room air

## 2021-05-18 ENCOUNTER — RESULT REVIEW (OUTPATIENT)
Age: 41
End: 2021-05-18

## 2021-05-19 ENCOUNTER — APPOINTMENT (OUTPATIENT)
Dept: PULMONOLOGY | Facility: CLINIC | Age: 41
End: 2021-05-19

## 2021-06-02 NOTE — ED ADULT NURSE NOTE - OBJECTIVE STATEMENT
Patient discussed on morning rounds with Dr. Perla    Operation / Date: 5/21/21 -- transhiatal esophagectomy after esophageal perforation during robotic assisted, hiatal hernia repair     SUBJECTIVE ASSESSMENT:  76 y/o female seen and examined bedside. Patient states she is awaiting her bronchoscopy this morning. Otherwise offering no acute complaints. She is using IS pulling 750cc. She is ambulating in the halls, and is voiding spontaneously. Her last BM was today. Denies chest pain, SOB, palpitations, N/V/D, abdominal pain, dizziness, fever or chills.       Vital Signs Last 24 Hrs  T(C): 36.4 (02 Jun 2021 05:14), Max: 36.6 (01 Jun 2021 09:53)  T(F): 97.5 (02 Jun 2021 05:14), Max: 97.9 (01 Jun 2021 22:21)  HR: 78 (02 Jun 2021 04:34) (78 - 82)  BP: 125/60 (02 Jun 2021 04:34) (114/71 - 140/64)  BP(mean): 85 (02 Jun 2021 04:34) (82 - 90)  RR: 12 (02 Jun 2021 04:34) (12 - 22)  SpO2: 93% (02 Jun 2021 04:34) (93% - 95%)  I&O's Detail    01 Jun 2021 07:01  -  02 Jun 2021 07:00  --------------------------------------------------------  IN:    Enteral Tube Flush: 20 mL    IV PiggyBack: 100 mL    Jevity 1.5: 270 mL    Oral Fluid: 300 mL  Total IN: 690 mL    OUT:    Chest Tube (mL): 50 mL    Chest Tube (mL): 450 mL    Voided (mL): 700 mL  Total OUT: 1200 mL    Total NET: -510 mL      02 Jun 2021 07:01  -  02 Jun 2021 09:09  --------------------------------------------------------  IN:  Total IN: 0 mL    OUT:    Voided (mL): 400 mL  Total OUT: 400 mL    Total NET: -400 mL          CHEST TUBE:  Yes. AIR LEAKS: No, on Suction  LISETH DRAIN:  No.  EPICARDIAL WIRES:No.  TIE DOWNS: Yes  JOY: No.    PHYSICAL EXAM:    General: Patient OOBTC, no acute distress   Neurological: Alert and oriented. No focal neurological deficits   Cardiovascular: S1S2, RRR, no murmurs appreciated on exam   Respiratory: Diminished at bilateral bases otherwise CTA, no wheezes, rales or rhonchi  Gastrointestinal: Abdomen soft, non tender, non distended   Extremities: Warm and well perfused. Trace peripheral edema b/l, no calf tenderness bilaterally  Vascular: Peripheral pulses palpable bilaterally  Incision Sites: Superior mediastinoscopy site appears c/d/i with steri strips, no surrounding signs of infection, no drainage. J-Tube site is intact. L pigtail site intact without evidence of kinks in the tube. VATS sites c/d/i.    LABS:                        10.4   5.47  )-----------( 376      ( 02 Jun 2021 06:14 )             32.7       COUMADIN:  No.  PT/INR - ( 01 Jun 2021 06:05 )   PT: 12.2 sec;   INR: 1.02          PTT - ( 01 Jun 2021 06:05 )  PTT:28.3 sec    06-02    135  |  99  |  9   ----------------------------<  101<H>  4.3   |  26  |  0.53    Ca    9.2      02 Jun 2021 06:14  Mg     2.3     06-02            MEDICATIONS  (STANDING):  heparin   Injectable 5000 Unit(s) SubCutaneous every 8 hours  lactated ringers. 500 milliLiter(s) (10 mL/Hr) IV Continuous <Continuous>  pantoprazole  Injectable 40 milliGRAM(s) IV Push daily  sodium chloride 0.9% lock flush 3 milliLiter(s) IV Push every 8 hours    MEDICATIONS  (PRN):  sodium chloride 0.65% Nasal 1 Spray(s) Both Nostrils three times a day PRN Nasal Congestion  tetracaine/benzocaine/butamben Spray 1 Spray(s) Topical every 4 hours PRN Sore Throat        RADIOLOGY & ADDITIONAL TESTS:  < from: Xray Chest 1 View- PORTABLE-Routine (Xray Chest 1 View- PORTABLE-Routine in AM.) (06.02.21 @ 04:26) >    EXAM:  XR CHEST PORTABLE ROUTINE 1V                          PROCEDURE DATE:  06/02/2021          INTERPRETATION:  Clinical history/reason for exam: Postop.    Frontal chest.    COMPARISON: June 1, 2021 time 3:22 PM.    Findings/  impression: Stable position left pigtail catheter. Left basilar opacity, increased. Right basilar opacity/pleural effusion, stable. Heart size within normal limits. Stable mediastinal bowel dilatation. Stable bony structures..      < end of copied text >     pt here for abd pain and an episode of diarrhea.  she denies fever or cough  abd is tender generally

## 2021-06-03 ENCOUNTER — APPOINTMENT (OUTPATIENT)
Dept: PULMONOLOGY | Facility: CLINIC | Age: 41
End: 2021-06-03
Payer: COMMERCIAL

## 2021-06-03 VITALS
WEIGHT: 175 LBS | HEIGHT: 66 IN | HEART RATE: 94 BPM | TEMPERATURE: 97.5 F | RESPIRATION RATE: 12 BRPM | BODY MASS INDEX: 28.12 KG/M2 | SYSTOLIC BLOOD PRESSURE: 128 MMHG | OXYGEN SATURATION: 100 % | DIASTOLIC BLOOD PRESSURE: 84 MMHG

## 2021-06-03 DIAGNOSIS — Z78.9 OTHER SPECIFIED HEALTH STATUS: ICD-10-CM

## 2021-06-03 DIAGNOSIS — Z82.49 FAMILY HISTORY OF ISCHEMIC HEART DISEASE AND OTHER DISEASES OF THE CIRCULATORY SYSTEM: ICD-10-CM

## 2021-06-03 DIAGNOSIS — R91.1 SOLITARY PULMONARY NODULE: ICD-10-CM

## 2021-06-03 PROCEDURE — 99072 ADDL SUPL MATRL&STAF TM PHE: CPT

## 2021-06-03 PROCEDURE — 99214 OFFICE O/P EST MOD 30 MIN: CPT

## 2021-06-03 NOTE — ASSESSMENT
[FreeTextEntry1] : In summary the patient is a 41-year-old female who presents today for follow-up and review of her recent CAT scan.  The patient's physical exam is within normal limits.  The patient has multiple small nodules and according to the Fleischner Society these are considered benign.  There is no need for further imaging.  I had a lengthy discussion with the patient at this time she is instructed to continue her current medications and to follow-up as needed

## 2021-06-03 NOTE — HISTORY OF PRESENT ILLNESS
[Never] : never [TextBox_4] : Patient is a 41-year-old female with past medical history significant for gastroesophageal reflux disorder who was found to have multiple pulmonary nodules on CT and who presents for follow-up.  The patient offers no complaints.  Her repeat CAT scan reveals nodules ranging in size from 2 mm to 3 mm with a calcified granuloma.  Patient currently denies fevers chills chest pain weight loss or hemoptysis

## 2021-06-03 NOTE — REASON FOR VISIT
[Follow-Up] : a follow-up visit [Abnormal CXR/ Chest CT] : an abnormal CXR/ chest CT [Pulmonary Nodules] : pulmonary nodules
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
## 2021-10-07 ENCOUNTER — NON-APPOINTMENT (OUTPATIENT)
Age: 41
End: 2021-10-07

## 2021-10-07 ENCOUNTER — APPOINTMENT (OUTPATIENT)
Dept: INTERNAL MEDICINE | Facility: CLINIC | Age: 41
End: 2021-10-07
Payer: COMMERCIAL

## 2021-10-07 VITALS
OXYGEN SATURATION: 99 % | HEIGHT: 66 IN | DIASTOLIC BLOOD PRESSURE: 78 MMHG | RESPIRATION RATE: 15 BRPM | SYSTOLIC BLOOD PRESSURE: 117 MMHG | TEMPERATURE: 97.3 F | BODY MASS INDEX: 29.25 KG/M2 | HEART RATE: 76 BPM | WEIGHT: 182 LBS

## 2021-10-07 DIAGNOSIS — K57.32 DIVERTICULITIS OF LARGE INTESTINE W/OUT PERFORATION OR ABSCESS W/OUT BLEEDING: ICD-10-CM

## 2021-10-07 PROCEDURE — 36415 COLL VENOUS BLD VENIPUNCTURE: CPT

## 2021-10-07 PROCEDURE — 93000 ELECTROCARDIOGRAM COMPLETE: CPT

## 2021-10-07 PROCEDURE — 99395 PREV VISIT EST AGE 18-39: CPT | Mod: 25

## 2021-10-08 PROBLEM — K57.32 DIVERTICULITIS OF COLON: Status: ACTIVE | Noted: 2020-05-22

## 2021-10-09 ENCOUNTER — NON-APPOINTMENT (OUTPATIENT)
Age: 41
End: 2021-10-09

## 2021-10-09 NOTE — END OF VISIT
[FreeTextEntry3] : I, Kelby Ruiz, personally transcribed these services in the presence of Dr. Faye Witt MD. I, Dr. Faye Witt MD, personally performed the services described in this documentation as scribed by Kelby Ruiz in my presence and it is both accurate and complete.

## 2021-10-09 NOTE — HISTORY OF PRESENT ILLNESS
[FreeTextEntry1] : Physical examination  [de-identified] : DALIA COLON is a 41 year old female with a PMHx of HPV, anxiety, and diverticulitis who presents today for physical examination. She is feeling okay and offers no specific complaints. She is undergoing treatments for IVF.\par \par She relates that last month, she was found to have diverticulitis treated  at [facility]. \par \par

## 2021-10-09 NOTE — PHYSICAL EXAM
[No Acute Distress] : no acute distress [Well Nourished] : well nourished [Well Developed] : well developed [Well-Appearing] : well-appearing [Normal Sclera/Conjunctiva] : normal sclera/conjunctiva [PERRL] : pupils equal round and reactive to light [EOMI] : extraocular movements intact [Normal Outer Ear/Nose] : the outer ears and nose were normal in appearance [Normal Oropharynx] : the oropharynx was normal [No JVD] : no jugular venous distention [No Lymphadenopathy] : no lymphadenopathy [Supple] : supple [Thyroid Normal, No Nodules] : the thyroid was normal and there were no nodules present [No Respiratory Distress] : no respiratory distress  [No Accessory Muscle Use] : no accessory muscle use [Clear to Auscultation] : lungs were clear to auscultation bilaterally [Normal Rate] : normal rate  [Regular Rhythm] : with a regular rhythm [Normal S1, S2] : normal S1 and S2 [No Murmur] : no murmur heard [No Carotid Bruits] : no carotid bruits [No Abdominal Bruit] : a ~M bruit was not heard ~T in the abdomen [No Varicosities] : no varicosities [Pedal Pulses Present] : the pedal pulses are present [No Edema] : there was no peripheral edema [No Palpable Aorta] : no palpable aorta [No Extremity Clubbing/Cyanosis] : no extremity clubbing/cyanosis [Normal Appearance] : normal in appearance [No Nipple Discharge] : no nipple discharge [No Axillary Lymphadenopathy] : no axillary lymphadenopathy [Soft] : abdomen soft [Non Tender] : non-tender [Non-distended] : non-distended [No Masses] : no abdominal mass palpated [No HSM] : no HSM [Normal Bowel Sounds] : normal bowel sounds [Normal Posterior Cervical Nodes] : no posterior cervical lymphadenopathy [Normal Anterior Cervical Nodes] : no anterior cervical lymphadenopathy [No CVA Tenderness] : no CVA  tenderness [No Spinal Tenderness] : no spinal tenderness [No Joint Swelling] : no joint swelling [Grossly Normal Strength/Tone] : grossly normal strength/tone [No Rash] : no rash [Coordination Grossly Intact] : coordination grossly intact [No Focal Deficits] : no focal deficits [Normal Gait] : normal gait [Deep Tendon Reflexes (DTR)] : deep tendon reflexes were 2+ and symmetric [Normal Affect] : the affect was normal [Normal Insight/Judgement] : insight and judgment were intact [de-identified] : fibrocystic right breast [FreeTextEntry1] : N/A

## 2021-10-09 NOTE — HEALTH RISK ASSESSMENT
[Good] : ~his/her~  mood as  good [No] : In the past 12 months have you used drugs other than those required for medical reasons? No [No falls in past year] : Patient reported no falls in the past year [0] : 2) Feeling down, depressed, or hopeless: Not at all (0) [] : No [Audit-CScore] : 0 [de-identified] : lightly active [de-identified] : regular [[id number]] : 0 [MammogramDate] : 05/2021 [MammogramComments] : Breast biopsy done 05/2021 was benign. [PapSmearDate] : 09/2021

## 2021-10-12 DIAGNOSIS — R82.90 UNSPECIFIED ABNORMAL FINDINGS IN URINE: ICD-10-CM

## 2021-10-12 LAB
25(OH)D3 SERPL-MCNC: 57.1 NG/ML
ALBUMIN SERPL ELPH-MCNC: 4.8 G/DL
ALP BLD-CCNC: 87 U/L
ALT SERPL-CCNC: 18 U/L
ANION GAP SERPL CALC-SCNC: 11 MMOL/L
APPEARANCE: ABNORMAL
AST SERPL-CCNC: 22 U/L
BACTERIA: ABNORMAL
BASOPHILS # BLD AUTO: 0.06 K/UL
BASOPHILS NFR BLD AUTO: 0.7 %
BILIRUB SERPL-MCNC: 0.3 MG/DL
BILIRUBIN URINE: NEGATIVE
BLOOD URINE: ABNORMAL
BUN SERPL-MCNC: 11 MG/DL
CALCIUM SERPL-MCNC: 9.3 MG/DL
CHLORIDE SERPL-SCNC: 105 MMOL/L
CHOLEST SERPL-MCNC: 180 MG/DL
CO2 SERPL-SCNC: 24 MMOL/L
COLOR: YELLOW
CREAT SERPL-MCNC: 0.59 MG/DL
EOSINOPHIL # BLD AUTO: 0.2 K/UL
EOSINOPHIL NFR BLD AUTO: 2.5 %
ESTIMATED AVERAGE GLUCOSE: 114 MG/DL
GLUCOSE QUALITATIVE U: NEGATIVE
GLUCOSE SERPL-MCNC: 82 MG/DL
HBA1C MFR BLD HPLC: 5.6 %
HCT VFR BLD CALC: 38.6 %
HDLC SERPL-MCNC: 55 MG/DL
HGB BLD-MCNC: 12.5 G/DL
HYALINE CASTS: 2 /LPF
IMM GRANULOCYTES NFR BLD AUTO: 0.4 %
KETONES URINE: NEGATIVE
LDLC SERPL CALC-MCNC: 115 MG/DL
LEUKOCYTE ESTERASE URINE: ABNORMAL
LYMPHOCYTES # BLD AUTO: 2.7 K/UL
LYMPHOCYTES NFR BLD AUTO: 33.5 %
MAN DIFF?: NORMAL
MCHC RBC-ENTMCNC: 30.1 PG
MCHC RBC-ENTMCNC: 32.4 GM/DL
MCV RBC AUTO: 93 FL
MICROSCOPIC-UA: NORMAL
MONOCYTES # BLD AUTO: 0.69 K/UL
MONOCYTES NFR BLD AUTO: 8.6 %
NEUTROPHILS # BLD AUTO: 4.37 K/UL
NEUTROPHILS NFR BLD AUTO: 54.3 %
NITRITE URINE: NEGATIVE
NONHDLC SERPL-MCNC: 124 MG/DL
PH URINE: 6.5
PLATELET # BLD AUTO: 292 K/UL
POTASSIUM SERPL-SCNC: 4.4 MMOL/L
PROT SERPL-MCNC: 7.6 G/DL
PROTEIN URINE: NEGATIVE
RBC # BLD: 4.15 M/UL
RBC # FLD: 13.2 %
RED BLOOD CELLS URINE: 15 /HPF
SODIUM SERPL-SCNC: 141 MMOL/L
SPECIFIC GRAVITY URINE: 1.02
SQUAMOUS EPITHELIAL CELLS: 5 /HPF
T3 SERPL-MCNC: 118 NG/DL
T4 FREE SERPL-MCNC: 1.3 NG/DL
T4 SERPL-MCNC: 7.9 UG/DL
TRIGL SERPL-MCNC: 45 MG/DL
TSH SERPL-ACNC: 1.1 UIU/ML
UROBILINOGEN URINE: NORMAL
VIT B12 SERPL-MCNC: 695 PG/ML
WBC # FLD AUTO: 8.05 K/UL
WHITE BLOOD CELLS URINE: 25 /HPF

## 2021-10-15 LAB
APPEARANCE: CLEAR
BACTERIA: NEGATIVE
BILIRUBIN URINE: NEGATIVE
BLOOD URINE: NEGATIVE
COLOR: NORMAL
GLUCOSE QUALITATIVE U: NEGATIVE
HYALINE CASTS: 1 /LPF
KETONES URINE: NEGATIVE
LEUKOCYTE ESTERASE URINE: NEGATIVE
MICROSCOPIC-UA: NORMAL
NITRITE URINE: NEGATIVE
PH URINE: 5.5
PROTEIN URINE: NEGATIVE
RED BLOOD CELLS URINE: 1 /HPF
SPECIFIC GRAVITY URINE: 1.02
SQUAMOUS EPITHELIAL CELLS: 3 /HPF
UROBILINOGEN URINE: NORMAL
WHITE BLOOD CELLS URINE: 1 /HPF

## 2021-10-17 LAB — BACTERIA UR CULT: NORMAL

## 2022-01-10 NOTE — OB PROVIDER TRIAGE NOTE - NS_ATTENDINFORMED_OBGYN_ALL_OB
MORRIS Waddell Consent (Spinal Accessory)/Introductory Paragraph: The rationale for Mohs was explained to the patient and consent was obtained. The risks, benefits and alternatives to therapy were discussed in detail. Specifically, the risks of damage to the spinal accessory nerve, infection, scarring, bleeding, prolonged wound healing, incomplete removal, allergy to anesthesia, and recurrence were addressed. Prior to the procedure, the treatment site was clearly identified and confirmed by the patient. All components of Universal Protocol/PAUSE Rule completed.

## 2022-04-06 NOTE — DISCHARGE NOTE OB - ADMISSION DATE +STARTOFVISITDATE
Statement Selected
OB HX:   FT  x2, largest    GYN HX: Denies uterine fibroids, ovarian cysts, abnormal paps, STIs

## 2022-05-27 ENCOUNTER — EMERGENCY (EMERGENCY)
Facility: HOSPITAL | Age: 42
LOS: 1 days | Discharge: STILL ADMITTED OR ADMINISTRATIVE | End: 2022-05-27
Admitting: EMERGENCY MEDICINE
Payer: COMMERCIAL

## 2022-05-27 ENCOUNTER — OUTPATIENT (OUTPATIENT)
Dept: INPATIENT UNIT | Facility: HOSPITAL | Age: 42
LOS: 1 days | Discharge: HOME | End: 2022-05-27
Payer: COMMERCIAL

## 2022-05-27 VITALS
RESPIRATION RATE: 17 BRPM | DIASTOLIC BLOOD PRESSURE: 69 MMHG | SYSTOLIC BLOOD PRESSURE: 116 MMHG | HEART RATE: 96 BPM | TEMPERATURE: 98 F

## 2022-05-27 VITALS
OXYGEN SATURATION: 99 % | HEIGHT: 67 IN | HEART RATE: 97 BPM | SYSTOLIC BLOOD PRESSURE: 129 MMHG | DIASTOLIC BLOOD PRESSURE: 74 MMHG | TEMPERATURE: 98 F | RESPIRATION RATE: 16 BRPM

## 2022-05-27 VITALS — SYSTOLIC BLOOD PRESSURE: 108 MMHG | HEART RATE: 78 BPM | DIASTOLIC BLOOD PRESSURE: 65 MMHG

## 2022-05-27 DIAGNOSIS — R87.89 OTHER ABNORMAL FINDINGS IN SPECIMENS FROM FEMALE GENITAL ORGANS: Chronic | ICD-10-CM

## 2022-05-27 DIAGNOSIS — Z3A.00 WEEKS OF GESTATION OF PREGNANCY NOT SPECIFIED: ICD-10-CM

## 2022-05-27 DIAGNOSIS — O26.899 OTHER SPECIFIED PREGNANCY RELATED CONDITIONS, UNSPECIFIED TRIMESTER: ICD-10-CM

## 2022-05-27 LAB
ANION GAP SERPL CALC-SCNC: 12 MMOL/L — SIGNIFICANT CHANGE UP (ref 7–14)
APPEARANCE UR: ABNORMAL
BACTERIA # UR AUTO: ABNORMAL
BASOPHILS # BLD AUTO: 0.03 K/UL — SIGNIFICANT CHANGE UP (ref 0–0.2)
BASOPHILS NFR BLD AUTO: 0.3 % — SIGNIFICANT CHANGE UP (ref 0–2)
BILIRUB UR-MCNC: NEGATIVE — SIGNIFICANT CHANGE UP
BUN SERPL-MCNC: 4 MG/DL — LOW (ref 7–23)
CALCIUM SERPL-MCNC: 8.9 MG/DL — SIGNIFICANT CHANGE UP (ref 8.4–10.5)
CHLORIDE SERPL-SCNC: 106 MMOL/L — SIGNIFICANT CHANGE UP (ref 98–107)
CO2 SERPL-SCNC: 21 MMOL/L — LOW (ref 22–31)
COLOR SPEC: YELLOW — SIGNIFICANT CHANGE UP
CREAT SERPL-MCNC: 0.47 MG/DL — LOW (ref 0.5–1.3)
DIFF PNL FLD: NEGATIVE — SIGNIFICANT CHANGE UP
EGFR: 123 ML/MIN/1.73M2 — SIGNIFICANT CHANGE UP
EOSINOPHIL # BLD AUTO: 0.11 K/UL — SIGNIFICANT CHANGE UP (ref 0–0.5)
EOSINOPHIL NFR BLD AUTO: 1.1 % — SIGNIFICANT CHANGE UP (ref 0–6)
EPI CELLS # UR: 13 /HPF — HIGH (ref 0–5)
GLUCOSE SERPL-MCNC: 97 MG/DL — SIGNIFICANT CHANGE UP (ref 70–99)
GLUCOSE UR QL: NEGATIVE — SIGNIFICANT CHANGE UP
HCT VFR BLD CALC: 31.7 % — LOW (ref 34.5–45)
HGB BLD-MCNC: 10.5 G/DL — LOW (ref 11.5–15.5)
HYALINE CASTS # UR AUTO: 2 /LPF — SIGNIFICANT CHANGE UP (ref 0–7)
IANC: 7.24 K/UL — SIGNIFICANT CHANGE UP (ref 1.8–7.4)
IMM GRANULOCYTES NFR BLD AUTO: 0.7 % — SIGNIFICANT CHANGE UP (ref 0–1.5)
KETONES UR-MCNC: NEGATIVE — SIGNIFICANT CHANGE UP
LEUKOCYTE ESTERASE UR-ACNC: ABNORMAL
LYMPHOCYTES # BLD AUTO: 1.79 K/UL — SIGNIFICANT CHANGE UP (ref 1–3.3)
LYMPHOCYTES # BLD AUTO: 18.3 % — SIGNIFICANT CHANGE UP (ref 13–44)
MCHC RBC-ENTMCNC: 30 PG — SIGNIFICANT CHANGE UP (ref 27–34)
MCHC RBC-ENTMCNC: 33.1 GM/DL — SIGNIFICANT CHANGE UP (ref 32–36)
MCV RBC AUTO: 90.6 FL — SIGNIFICANT CHANGE UP (ref 80–100)
MONOCYTES # BLD AUTO: 0.54 K/UL — SIGNIFICANT CHANGE UP (ref 0–0.9)
MONOCYTES NFR BLD AUTO: 5.5 % — SIGNIFICANT CHANGE UP (ref 2–14)
NEUTROPHILS # BLD AUTO: 7.24 K/UL — SIGNIFICANT CHANGE UP (ref 1.8–7.4)
NEUTROPHILS NFR BLD AUTO: 74.1 % — SIGNIFICANT CHANGE UP (ref 43–77)
NITRITE UR-MCNC: NEGATIVE — SIGNIFICANT CHANGE UP
NRBC # BLD: 0 /100 WBCS — SIGNIFICANT CHANGE UP
NRBC # FLD: 0 K/UL — SIGNIFICANT CHANGE UP
PH UR: 6.5 — SIGNIFICANT CHANGE UP (ref 5–8)
PLATELET # BLD AUTO: 234 K/UL — SIGNIFICANT CHANGE UP (ref 150–400)
POTASSIUM SERPL-MCNC: 4.4 MMOL/L — SIGNIFICANT CHANGE UP (ref 3.5–5.3)
POTASSIUM SERPL-SCNC: 4.4 MMOL/L — SIGNIFICANT CHANGE UP (ref 3.5–5.3)
PROT UR-MCNC: ABNORMAL
RBC # BLD: 3.5 M/UL — LOW (ref 3.8–5.2)
RBC # FLD: 13.3 % — SIGNIFICANT CHANGE UP (ref 10.3–14.5)
RBC CASTS # UR COMP ASSIST: 6 /HPF — HIGH (ref 0–4)
SODIUM SERPL-SCNC: 139 MMOL/L — SIGNIFICANT CHANGE UP (ref 135–145)
SP GR SPEC: 1.02 — SIGNIFICANT CHANGE UP (ref 1–1.05)
UROBILINOGEN FLD QL: SIGNIFICANT CHANGE UP
WBC # BLD: 9.78 K/UL — SIGNIFICANT CHANGE UP (ref 3.8–10.5)
WBC # FLD AUTO: 9.78 K/UL — SIGNIFICANT CHANGE UP (ref 3.8–10.5)
WBC UR QL: 12 /HPF — HIGH (ref 0–5)

## 2022-05-27 PROCEDURE — 99212 OFFICE O/P EST SF 10 MIN: CPT

## 2022-05-27 PROCEDURE — 76818 FETAL BIOPHYS PROFILE W/NST: CPT | Mod: 26

## 2022-05-27 PROCEDURE — L9996: CPT

## 2022-05-27 PROCEDURE — 76705 ECHO EXAM OF ABDOMEN: CPT | Mod: 26,76

## 2022-05-27 RX ORDER — L.ACIDOPH/B.ANIMALIS/B.LONGUM 15B CELL
1 CAPSULE ORAL
Qty: 0 | Refills: 0 | DISCHARGE

## 2022-05-27 RX ORDER — CHOLECALCIFEROL (VITAMIN D3) 125 MCG
1 CAPSULE ORAL
Qty: 0 | Refills: 0 | DISCHARGE

## 2022-05-27 RX ORDER — SIMETHICONE 80 MG/1
160 TABLET, CHEWABLE ORAL ONCE
Refills: 0 | Status: COMPLETED | OUTPATIENT
Start: 2022-05-27 | End: 2022-05-27

## 2022-05-27 RX ORDER — SODIUM CHLORIDE 9 MG/ML
3 INJECTION INTRAMUSCULAR; INTRAVENOUS; SUBCUTANEOUS EVERY 8 HOURS
Refills: 0 | Status: DISCONTINUED | OUTPATIENT
Start: 2022-05-27 | End: 2022-06-10

## 2022-05-27 RX ADMIN — SIMETHICONE 160 MILLIGRAM(S): 80 TABLET, CHEWABLE ORAL at 05:10

## 2022-05-27 NOTE — OB PROVIDER TRIAGE NOTE - NSOBPROVIDERNOTE_OBGYN_ALL_OB_FT
42y  at 31w5d with right sided abdominal pain  Case d.w Dr Crowder  Fetal monitoring  Bedside sono done  CBC, BMP, UA sent  IV saline lock  US abdomen complete  US Appendix done 42y  at 31w5d with right sided abdominal pain  Case d.w Dr Crowder  Fetal monitoring  Bedside sono done  CBC, BMP, UA sent  IV saline lock  US abdomen complete  US Appendix done    patient cleared for discharge home, ambulatory and stable  reviewed signs and symptoms of labor  reviewed signs and symptoms of worsening pain and when to report to MD  patient has OB appointment next thursday, encouraged to see MD by Tuesday or report to ED if pain presents     d/w Dr. Bazzi PGY 4 & Dr. Zayas 42y  at 31w5d with right sided abdominal pain  Case d.w Dr Crowder  Fetal monitoring  Bedside sono done  CBC, BMP, UA sent  IV saline lock  US abdomen complete  US Appendix done    endorsed care at 0700  patient conformable requesting food at this time sleeping   patient reports pain has  completely subsided     0900  discussed case at safety rounds  patient for AST/ ALT  Amlyase/ Lipase; will follow up outpatient   pending imaging results     1030  reviewed imaging results and care with [place]; safety  patient cleared for discharge home     upon reviewed discharge with patient, patient reports unilateral pulling pain on right lower groin; positional   Rose Mary made aware of patients new complaint and available for assessment   per bedside assessment, evidence of round ligament pain vs musculoskeletals pain   patient education provided by MD for labor pain, and round ligament pain   rest, ice, heat and tylenol encouraged for patient comfort  patient made aware of signs and symptoms needed for immediate evaluation     patient cleared for discharge home, ambulatory and stable  reviewed signs and symptoms of labor  reviewed signs and symptoms of worsening pain and when to report to MD  patient has OB appointment next thursday, encouraged to see MD by Tuesday or report to ED if pain presents     d/w Dr. Bazzi PGY 4 & Dr. Zayas 42y  at 31w5d with right sided abdominal pain  Case d.w Dr Crowder  Fetal monitoring  Bedside sono done  CBC, BMP, UA sent  IV saline lock  US abdomen complete  US Appendix done    endorsed care at 0700  patient conformable requesting food at this time sleeping   patient reports pain has  completely subsided   had bowel movement and passed gas     0900  discussed case at safety rounds  patient for AST/ ALT  Amlyase/ Lipase; will follow up outpatient   pending imaging results     1030  reviewed imaging results and care with [place]; safety  patient cleared for discharge home     upon reviewed discharge with patient, patient reports unilateral pulling pain on right lower groin; positional   Rose Mary made aware of patients new complaint and available for assessment   per bedside assessment, evidence of round ligament pain vs musculoskeletals pain   patient education provided by MD for labor pain, and round ligament pain   rest, ice, heat and tylenol encouraged for patient comfort  patient made aware of signs and symptoms needed for immediate evaluation     patient cleared for discharge home, ambulatory and stable  reviewed signs and symptoms of labor  reviewed signs and symptoms of worsening pain and when to report to MD  patient has OB appointment next thursday, encouraged to see MD by Tuesday or report to ED if pain presents     d/w Dr. Bazzi PGY 4 & Dr. Zayas

## 2022-05-27 NOTE — OB RN TRIAGE NOTE - NSICDXPASTSURGICALHX_GEN_ALL_CORE_FT
PAST SURGICAL HISTORY:  Abnormal Papanicolaou smear of cervix with positive human papilloma virus (HPV) test Cryo

## 2022-05-27 NOTE — ED ADULT TRIAGE NOTE - CHIEF COMPLAINT QUOTE
Pt. is brought to [facility] ED by [facility] for right flank pain radiating to right abdomen for 0900. c/o very mild pain in right upper quadrant. No pregnancy complaints. Pt. is 32 weeks gestation, para 2  1. PMH: placental previa. Called [facility]. Instructed to send to [facility]. Spoke to Dr. Burks, ED attending. Instructed to send to [facility] & [facility].

## 2022-05-27 NOTE — OB RN TRIAGE NOTE - MENTAL HEALTH CONDITIONS/SYMPTOMS, PROFILE
was prescribed medications , but never took any as per the patient , denies symptoms/anxiety disorder

## 2022-05-27 NOTE — OB PROVIDER TRIAGE NOTE - NSHPLABSRESULTS_GEN_ALL_CORE
CBC, BMP, UA 10.5   9.78  )-----------( 234      ( 05-27 @ 05:19 )             31.7     05-27 @ 05:19    139  |  106  |  4   ----------------------------<  97  4.4   |  21  |  0.47    Ca    8.9      05-27 @ 05:19    TPro  6.2  /  Alb  3.6  /  TBili  <0.2  /  DBili  <0.2  /  AST  21  /  ALT  12  /  AlkPhos  89  05-27 @ 05:19

## 2022-05-27 NOTE — OB PROVIDER TRIAGE NOTE - NSHPPHYSICALEXAM_GEN_ALL_CORE
T(C): 36.8 (05-27-22 @ 04:33), Max: 36.9 (05-27-22 @ 03:07)  HR: 96 (05-27-22 @ 04:41) (96 - 97)  BP: 116/69 (05-27-22 @ 04:41) (116/69 - 129/74)  RR: 17 (05-27-22 @ 04:33) (16 - 17)  SpO2: 99% (05-27-22 @ 03:07) (99% - 99%)    Heart: RRR  Lungs: CTA  Abdomen: Gravid, soft, +Rt sided abdominal tenderness upon palpation, no rebound, no guarding    NST: Reactive with moderate variability, Category 1 tracing  [place]: No contractions  TAS: SLIUP, Placenta posterior previa, Cephalic, TERRI: 17, BPP 8/8           EFW: 1919g  SSE: no pooling, Cervix appears closed   TVS: Cervix 3.5cm long, no funneling T(C): 36.8 (05-27-22 @ 04:33), Max: 36.9 (05-27-22 @ 03:07)  HR: 96 (05-27-22 @ 04:41) (96 - 97)  BP: 116/69 (05-27-22 @ 04:41) (116/69 - 129/74)  RR: 17 (05-27-22 @ 04:33) (16 - 17)  SpO2: 99% (05-27-22 @ 03:07) (99% - 99%)    Heart: RRR  Lungs: CTA  Abdomen: Gravid, soft, +Rt sided abdominal tenderness upon palpation, no rebound, no guarding                   No CVA tenderness bilaterally    NST: Reactive with moderate variability, Category 1 tracing  [place]: No contractions  TAS: SLIUP, Placenta posterior previa, Cephalic, TERRI: 17, BPP 8/8           EFW: 1919g  SSE: no pooling, Cervix appears closed   TVS: Cervix 3.5cm long, no funneling

## 2022-05-27 NOTE — OB PROVIDER TRIAGE NOTE - HISTORY OF PRESENT ILLNESS
42y  at 31w5d presents to triage c/o right sided abdominal pain which started yesterday morning, felt better for most of the day than the pain returns much strongly which woke her up from sleep. States pain scale 5/10. Denies any urinary symptoms. Denies feeling any contractions or cramping. Denies any nausea, no vomiting, no fever, no chills. Pt was asking for food in triage.  Reports +FM, no vaginal bleeding, no ROM or LOF  Prenatal care: Dr Zavala; Pregnancy complicated by placenta previa.

## 2022-05-27 NOTE — OB RN TRIAGE NOTE - NSICDXPASTMEDICALHX_GEN_ALL_CORE_FT
PAST MEDICAL HISTORY:  2019 novel coronavirus disease (COVID-19) 12/2021 , no symptoms    Diverticulitis 2020 , s/p medications    No pertinent past medical history

## 2022-05-27 NOTE — OB RN TRIAGE NOTE - FALL HARM RISK - UNIVERSAL INTERVENTIONS
Bed in lowest position, wheels locked, appropriate side rails in place/Call bell, personal items and telephone in reach/Instruct patient to call for assistance before getting out of bed or chair/Non-slip footwear when patient is out of bed/[place] to call system/Physically safe environment - no spills, clutter or unnecessary equipment/Purposeful Proactive Rounding/Room/bathroom lighting operational, light cord in reach

## 2022-05-27 NOTE — ED ADULT NURSE NOTE - CHIEF COMPLAINT QUOTE
Pt. is brought to [facility] ED by [facility] for right flank pain radiating to right abdomen for 0900. c/o very mild pain in right upper quadrant. No pregnancy complaints. Pt. is 32 weeks gestation, para 2  1. PMH: placental previa. Called [facility]. Instructed to send to [facility] & [facility]. Spoke to Dr. Burks, ED attending. Instructed to send to L & [facility].

## 2022-05-27 NOTE — OB RN TRIAGE NOTE - NSNURSINGINSTR_OBGYN_ALL_OB_FT
Pt discharged home with instructions given by LARISA Dick. Pt verbalizes understanding of d/c instructions.

## 2022-05-27 NOTE — OB PROVIDER TRIAGE NOTE - ADDITIONAL INSTRUCTIONS
patient cleared for discharge home, ambulatory and stable  reviewed signs and symptoms of labor  reviewed signs and symptoms of worsening pain and when to report to MD  patient has OB appointment next thursday, encouraged to see MD by Tuesday or report to ED if pain presents

## 2022-05-28 LAB
CULTURE RESULTS: SIGNIFICANT CHANGE UP
SPECIMEN SOURCE: SIGNIFICANT CHANGE UP

## 2022-09-12 NOTE — OB RN PATIENT PROFILE - LIVING CHILDREN, OB PROFILE
General Call      Reason for Call:  Pt tested positive for Covid and is asking for Paxlovid    Please advise    Pharm:  [address]    What are your questions or concerns:  n/a    Date of last appointment with provider: n/a    Could we send this information to you in [facility] or would you prefer to receive a phone call?:   Patient would prefer a phone call   Okay to leave a detailed message?: Yes at Cell number on file:    Telephone Information:   Mobile [phone]      0

## 2022-09-13 PROBLEM — U07.1 COVID-19: Chronic | Status: ACTIVE | Noted: 2022-05-27

## 2022-09-13 PROBLEM — K57.92 DIVERTICULITIS OF INTESTINE, PART UNSPECIFIED, WITHOUT PERFORATION OR ABSCESS WITHOUT BLEEDING: Chronic | Status: ACTIVE | Noted: 2022-05-27

## 2022-10-12 ENCOUNTER — APPOINTMENT (OUTPATIENT)
Dept: INTERNAL MEDICINE | Facility: CLINIC | Age: 42
End: 2022-10-12

## 2022-10-12 ENCOUNTER — NON-APPOINTMENT (OUTPATIENT)
Age: 42
End: 2022-10-12

## 2022-10-12 VITALS
HEIGHT: 66 IN | BODY MASS INDEX: 28.12 KG/M2 | TEMPERATURE: 98.1 F | OXYGEN SATURATION: 98 % | SYSTOLIC BLOOD PRESSURE: 131 MMHG | WEIGHT: 175 LBS | DIASTOLIC BLOOD PRESSURE: 84 MMHG | RESPIRATION RATE: 16 BRPM | HEART RATE: 92 BPM

## 2022-10-12 DIAGNOSIS — F41.9 ANXIETY DISORDER, UNSPECIFIED: ICD-10-CM

## 2022-10-12 DIAGNOSIS — Z00.00 ENCOUNTER FOR GENERAL ADULT MEDICAL EXAMINATION W/OUT ABNORMAL FINDINGS: ICD-10-CM

## 2022-10-12 DIAGNOSIS — R00.2 PALPITATIONS: ICD-10-CM

## 2022-10-12 PROCEDURE — 99396 PREV VISIT EST AGE 40-64: CPT | Mod: 25

## 2022-10-12 PROCEDURE — 93000 ELECTROCARDIOGRAM COMPLETE: CPT

## 2022-10-12 PROCEDURE — 36415 COLL VENOUS BLD VENIPUNCTURE: CPT

## 2022-10-12 RX ORDER — ALPRAZOLAM 0.25 MG/1
0.25 TABLET ORAL TWICE DAILY
Qty: 60 | Refills: 0 | Status: ACTIVE | COMMUNITY
Start: 2022-10-12 | End: 1900-01-01

## 2022-10-14 LAB
ALBUMIN SERPL ELPH-MCNC: 4.8 G/DL
ALP BLD-CCNC: 87 U/L
ALT SERPL-CCNC: 26 U/L
ANION GAP SERPL CALC-SCNC: 14 MMOL/L
APPEARANCE: ABNORMAL
AST SERPL-CCNC: 17 U/L
BACTERIA: NEGATIVE
BASOPHILS # BLD AUTO: 0.08 K/UL
BASOPHILS NFR BLD AUTO: 1 %
BILIRUB SERPL-MCNC: 0.4 MG/DL
BILIRUBIN URINE: NEGATIVE
BLOOD URINE: NEGATIVE
BUN SERPL-MCNC: 15 MG/DL
CALCIUM SERPL-MCNC: 9.5 MG/DL
CHLORIDE SERPL-SCNC: 101 MMOL/L
CHOLEST SERPL-MCNC: 205 MG/DL
CO2 SERPL-SCNC: 24 MMOL/L
COLOR: NORMAL
CREAT SERPL-MCNC: 0.59 MG/DL
EGFR: 117 ML/MIN/1.73M2
EOSINOPHIL # BLD AUTO: 0.16 K/UL
EOSINOPHIL NFR BLD AUTO: 2 %
GLUCOSE QUALITATIVE U: NEGATIVE
GLUCOSE SERPL-MCNC: 89 MG/DL
HCT VFR BLD CALC: 40.5 %
HDLC SERPL-MCNC: 65 MG/DL
HGB BLD-MCNC: 12.9 G/DL
HYALINE CASTS: 0 /LPF
IMM GRANULOCYTES NFR BLD AUTO: 0.5 %
KETONES URINE: NEGATIVE
LDLC SERPL CALC-MCNC: 126 MG/DL
LEUKOCYTE ESTERASE URINE: ABNORMAL
LYMPHOCYTES # BLD AUTO: 2.24 K/UL
LYMPHOCYTES NFR BLD AUTO: 28.6 %
MAN DIFF?: NORMAL
MCHC RBC-ENTMCNC: 29 PG
MCHC RBC-ENTMCNC: 31.9 GM/DL
MCV RBC AUTO: 91 FL
MICROSCOPIC-UA: NORMAL
MONOCYTES # BLD AUTO: 0.55 K/UL
MONOCYTES NFR BLD AUTO: 7 %
NEUTROPHILS # BLD AUTO: 4.77 K/UL
NEUTROPHILS NFR BLD AUTO: 60.9 %
NITRITE URINE: NEGATIVE
NONHDLC SERPL-MCNC: 140 MG/DL
PH URINE: 6
PLATELET # BLD AUTO: 288 K/UL
POTASSIUM SERPL-SCNC: 4.4 MMOL/L
PROT SERPL-MCNC: 7.7 G/DL
PROTEIN URINE: NEGATIVE
RBC # BLD: 4.45 M/UL
RBC # FLD: 14.1 %
RED BLOOD CELLS URINE: 3 /HPF
SODIUM SERPL-SCNC: 139 MMOL/L
SPECIFIC GRAVITY URINE: 1.01
SQUAMOUS EPITHELIAL CELLS: 5 /HPF
T3 SERPL-MCNC: 118 NG/DL
T4 FREE SERPL-MCNC: 1.2 NG/DL
T4 SERPL-MCNC: 7.9 UG/DL
TRIGL SERPL-MCNC: 71 MG/DL
TSH SERPL-ACNC: 1.37 UIU/ML
UROBILINOGEN URINE: NORMAL
WBC # FLD AUTO: 7.84 K/UL
WHITE BLOOD CELLS URINE: 18 /HPF

## 2022-10-16 NOTE — PLAN
[FreeTextEntry1] : Blood tests and urine sent to the lab\par \par EKG\par \par Echocardiogram \par \par Cardiology evaluation

## 2022-10-16 NOTE — HEALTH RISK ASSESSMENT
[Good] : ~his/her~  mood as  good [Never] : Never [No] : In the past 12 months have you used drugs other than those required for medical reasons? No [No falls in past year] : Patient reported no falls in the past year [0] : 2) Feeling down, depressed, or hopeless: Not at all (0) [Patient reported mammogram was normal] : Patient reported mammogram was normal [Audit-CScore] : 0 [de-identified] : lightly active [de-identified] : regular [[id number]] : 0 [MammogramDate] : 10/2022 [MammogramComments] : and breast US [PapSmearDate] : 09/2022

## 2022-10-16 NOTE — END OF VISIT
[FreeTextEntry3] : I, Kelby Ruiz, personally transcribed these services in the presence of Dr. Faye Witt MD.\par I, Dr. Faye Witt MD, personally performed the services described in this documentation as scribed by Kelby Ruiz in my presence and it is both accurate and complete.

## 2022-10-16 NOTE — HISTORY OF PRESENT ILLNESS
[FreeTextEntry1] : physical examination  [de-identified] : DALIA COLON is a 42 year old female with a PMHx of anxiety, diverticulitis of colon, nodule of left lung who presents today for physical examination. She is feeling okay however she relates palpitations at times for many years.

## 2022-10-24 ENCOUNTER — APPOINTMENT (OUTPATIENT)
Dept: HEART AND VASCULAR | Facility: CLINIC | Age: 42
End: 2022-10-24

## 2022-11-03 ENCOUNTER — APPOINTMENT (OUTPATIENT)
Dept: HEART AND VASCULAR | Facility: CLINIC | Age: 42
End: 2022-11-03

## 2022-11-03 PROCEDURE — 93306 TTE W/DOPPLER COMPLETE: CPT

## 2023-01-17 NOTE — OB PROVIDER IHI INDUCTION/AUGMENTATION NOTE - NS_CHECKALL_OBGYN_ALL_OB
FHR was reviewed/Induction / Augmentation was discussed/H&P was completed/Order was written/Contractions pattern was reviewed
No

## 2023-02-17 ENCOUNTER — APPOINTMENT (OUTPATIENT)
Dept: INTERNAL MEDICINE | Facility: CLINIC | Age: 43
End: 2023-02-17

## 2023-03-24 NOTE — ED ADULT TRIAGE NOTE - BP NONINVASIVE DIASTOLIC (MM HG)
Patient Instructions and orders for Wound Care        Serial surgical debridements as able   Continue topical gentamycin  And dakins soaks. Refill bactrim low dose preventive. Recommend compression wrap - when tolerated - refused due to pain. Stressed importance of offloading wounded area and elevating legs as much as possible. Wound Cleaning and Dressings:     Wash your hands with soap and water. Always wear gloves while changing dressings. Donot touch wound / vicente-wound skin with un-gloved hands. Remove old dressing, discard and place into trash. Cleansing: Cleanse with warm water and then use Dakins soak   Apply gentamycin - cover with xerform on wound bed - cover with  HF transfer -Sonia Ego with protection  Change dressing daily     Compression Therapy :  UNNA BOOT WITH CALAMINE LAYER     Compression Therapy Instructions:  1. Put on first thing in the morning and may remove at bedside. Okay to wear overnight      if comfortable. Do not let stockings roll up/down and kink. Hand wash stockings and      hang dry as needed. 2.  Avoid prolonged standing in one place. It is better to have your calf muscles moving       to pump fluid out of the legs. 3.  Elevate leg(s) above the level of the heart when sitting or as much as possible. 4.  Take your diuretics as directed by your provider. Do not skip doses or change doses      unless instructed to do so by your provider. 5. Do not get leg(s) with compression wrap wet. If wraps are too tight as indicated        By pain, numbness/tingling or discoloration of toes remove wrap completely       and call the   wound center. Miscellaneous Instructions:  Supplement with a daily multivitamin   Low salt diet  Increase protein intake / consider protein supplements - see below  Elevate extremities at all times when sitting / laying down.      DIETARY MODIFICATIONS TO HELP WITH WOUND HEALING:     Protein: Meats, beans, eggs, milk and yogurt particularly [place] yogurt), tofu, soy nuts, soy protein products     Vitamin C: Citrus fruits and juices, strawberries, tomatoes, tomato juice, peppers, baked potatoes, spinach, broccoli, cauliflower, [place] sprouts, cabbage     Vitamin A: Dark green, leafy vegetables, orange or yellow vegetables, cantaloupe, fortified dairy products, liver, fortified cereals     Zinc: Fortified cereals, red meats, seafood     Consider Donnie by Pro V&V (These are essential branch chain amino acids that help with tissue building and wound healing) and take 2 packets/day. you can order online at [employer] or [employer] REMINDERS:     The treatment plan has been discussed at length with you and your provider. Follow all instructions carefully, it is very important. If you do not follow all instructions, you are at  risk of your wound not healing, infection, possible loss of limb and even end of life. Please call the clinic during regular business hours ( 7:30 AM - 5:30 PM) if you notice increased redness, warmth, pain or pus like drainage or start running a fever greater than 100.3. For after hour emergencies, please call your primary physician or go to the nearest emergency room. 78

## 2023-04-07 ENCOUNTER — APPOINTMENT (OUTPATIENT)
Dept: INTERNAL MEDICINE | Facility: CLINIC | Age: 43
End: 2023-04-07

## 2023-04-14 ENCOUNTER — EMERGENCY (EMERGENCY)
Facility: HOSPITAL | Age: 43
LOS: 1 days | Discharge: HOME | End: 2023-04-14
Attending: EMERGENCY MEDICINE
Payer: COMMERCIAL

## 2023-04-14 VITALS
RESPIRATION RATE: 19 BRPM | TEMPERATURE: 98 F | HEART RATE: 83 BPM | OXYGEN SATURATION: 100 % | DIASTOLIC BLOOD PRESSURE: 85 MMHG | WEIGHT: 175.05 LBS | HEIGHT: 67 IN | SYSTOLIC BLOOD PRESSURE: 130 MMHG

## 2023-04-14 VITALS
TEMPERATURE: 98 F | SYSTOLIC BLOOD PRESSURE: 118 MMHG | OXYGEN SATURATION: 100 % | HEART RATE: 80 BPM | DIASTOLIC BLOOD PRESSURE: 67 MMHG | RESPIRATION RATE: 16 BRPM

## 2023-04-14 DIAGNOSIS — R87.89 OTHER ABNORMAL FINDINGS IN SPECIMENS FROM FEMALE GENITAL ORGANS: Chronic | ICD-10-CM

## 2023-04-14 LAB
ALBUMIN SERPL ELPH-MCNC: 4.7 G/DL — SIGNIFICANT CHANGE UP (ref 3.3–5)
ALP SERPL-CCNC: 86 U/L — SIGNIFICANT CHANGE UP (ref 40–120)
ALT FLD-CCNC: 20 U/L — SIGNIFICANT CHANGE UP (ref 10–45)
ANION GAP SERPL CALC-SCNC: 10 MMOL/L — SIGNIFICANT CHANGE UP (ref 5–17)
APPEARANCE UR: CLEAR — SIGNIFICANT CHANGE UP
AST SERPL-CCNC: 18 U/L — SIGNIFICANT CHANGE UP (ref 10–40)
BACTERIA # UR AUTO: NEGATIVE — SIGNIFICANT CHANGE UP
BASOPHILS # BLD AUTO: 0.03 K/UL — SIGNIFICANT CHANGE UP (ref 0–0.2)
BASOPHILS NFR BLD AUTO: 0.4 % — SIGNIFICANT CHANGE UP (ref 0–2)
BILIRUB SERPL-MCNC: 0.3 MG/DL — SIGNIFICANT CHANGE UP (ref 0.2–1.2)
BILIRUB UR-MCNC: NEGATIVE — SIGNIFICANT CHANGE UP
BUN SERPL-MCNC: 8 MG/DL — SIGNIFICANT CHANGE UP (ref 7–23)
CALCIUM SERPL-MCNC: 9 MG/DL — SIGNIFICANT CHANGE UP (ref 8.4–10.5)
CHLORIDE SERPL-SCNC: 103 MMOL/L — SIGNIFICANT CHANGE UP (ref 96–108)
CO2 SERPL-SCNC: 27 MMOL/L — SIGNIFICANT CHANGE UP (ref 22–31)
COLOR SPEC: SIGNIFICANT CHANGE UP
CREAT SERPL-MCNC: 0.55 MG/DL — SIGNIFICANT CHANGE UP (ref 0.5–1.3)
DIFF PNL FLD: NEGATIVE — SIGNIFICANT CHANGE UP
EGFR: 119 ML/MIN/1.73M2 — SIGNIFICANT CHANGE UP
EOSINOPHIL # BLD AUTO: 0.2 K/UL — SIGNIFICANT CHANGE UP (ref 0–0.5)
EOSINOPHIL NFR BLD AUTO: 2.9 % — SIGNIFICANT CHANGE UP (ref 0–6)
EPI CELLS # UR: 2 /HPF — SIGNIFICANT CHANGE UP
FLUAV AG NPH QL: SIGNIFICANT CHANGE UP
FLUBV AG NPH QL: SIGNIFICANT CHANGE UP
GLUCOSE SERPL-MCNC: 94 MG/DL — SIGNIFICANT CHANGE UP (ref 70–99)
GLUCOSE UR QL: NEGATIVE — SIGNIFICANT CHANGE UP
HCG SERPL-ACNC: <2 MIU/ML — SIGNIFICANT CHANGE UP
HCT VFR BLD CALC: 38.2 % — SIGNIFICANT CHANGE UP (ref 34.5–45)
HGB BLD-MCNC: 12.1 G/DL — SIGNIFICANT CHANGE UP (ref 11.5–15.5)
HYALINE CASTS # UR AUTO: 1 /LPF — SIGNIFICANT CHANGE UP (ref 0–2)
IMM GRANULOCYTES NFR BLD AUTO: 0.3 % — SIGNIFICANT CHANGE UP (ref 0–0.9)
KETONES UR-MCNC: NEGATIVE — SIGNIFICANT CHANGE UP
LEUKOCYTE ESTERASE UR-ACNC: ABNORMAL
LIDOCAIN IGE QN: 30 U/L — SIGNIFICANT CHANGE UP (ref 7–60)
LYMPHOCYTES # BLD AUTO: 2.86 K/UL — SIGNIFICANT CHANGE UP (ref 1–3.3)
LYMPHOCYTES # BLD AUTO: 41 % — SIGNIFICANT CHANGE UP (ref 13–44)
MCHC RBC-ENTMCNC: 29 PG — SIGNIFICANT CHANGE UP (ref 27–34)
MCHC RBC-ENTMCNC: 31.7 GM/DL — LOW (ref 32–36)
MCV RBC AUTO: 91.6 FL — SIGNIFICANT CHANGE UP (ref 80–100)
MONOCYTES # BLD AUTO: 0.51 K/UL — SIGNIFICANT CHANGE UP (ref 0–0.9)
MONOCYTES NFR BLD AUTO: 7.3 % — SIGNIFICANT CHANGE UP (ref 2–14)
NEUTROPHILS # BLD AUTO: 3.35 K/UL — SIGNIFICANT CHANGE UP (ref 1.8–7.4)
NEUTROPHILS NFR BLD AUTO: 48.1 % — SIGNIFICANT CHANGE UP (ref 43–77)
NITRITE UR-MCNC: NEGATIVE — SIGNIFICANT CHANGE UP
NRBC # BLD: 0 /100 WBCS — SIGNIFICANT CHANGE UP (ref 0–0)
PH UR: 6 — SIGNIFICANT CHANGE UP (ref 5–8)
PLATELET # BLD AUTO: 255 K/UL — SIGNIFICANT CHANGE UP (ref 150–400)
POTASSIUM SERPL-MCNC: 3.7 MMOL/L — SIGNIFICANT CHANGE UP (ref 3.5–5.3)
POTASSIUM SERPL-SCNC: 3.7 MMOL/L — SIGNIFICANT CHANGE UP (ref 3.5–5.3)
PROT SERPL-MCNC: 7.8 G/DL — SIGNIFICANT CHANGE UP (ref 6–8.3)
PROT UR-MCNC: NEGATIVE — SIGNIFICANT CHANGE UP
RBC # BLD: 4.17 M/UL — SIGNIFICANT CHANGE UP (ref 3.8–5.2)
RBC # FLD: 13 % — SIGNIFICANT CHANGE UP (ref 10.3–14.5)
RBC CASTS # UR COMP ASSIST: 7 /HPF — HIGH (ref 0–4)
RSV RNA NPH QL NAA+NON-PROBE: SIGNIFICANT CHANGE UP
SARS-COV-2 RNA SPEC QL NAA+PROBE: SIGNIFICANT CHANGE UP
SODIUM SERPL-SCNC: 140 MMOL/L — SIGNIFICANT CHANGE UP (ref 135–145)
SP GR SPEC: 1.01 — SIGNIFICANT CHANGE UP (ref 1.01–1.02)
UROBILINOGEN FLD QL: NEGATIVE — SIGNIFICANT CHANGE UP
WBC # BLD: 6.97 K/UL — SIGNIFICANT CHANGE UP (ref 3.8–10.5)
WBC # FLD AUTO: 6.97 K/UL — SIGNIFICANT CHANGE UP (ref 3.8–10.5)
WBC UR QL: 4 /HPF — SIGNIFICANT CHANGE UP (ref 0–5)

## 2023-04-14 PROCEDURE — 99284 EMERGENCY DEPT VISIT MOD MDM: CPT

## 2023-04-14 PROCEDURE — 83690 ASSAY OF LIPASE: CPT

## 2023-04-14 PROCEDURE — 74177 CT ABD & PELVIS W/CONTRAST: CPT | Mod: MA

## 2023-04-14 PROCEDURE — 96374 THER/PROPH/DIAG INJ IV PUSH: CPT | Mod: XU

## 2023-04-14 PROCEDURE — 74177 CT ABD & PELVIS W/CONTRAST: CPT | Mod: 26,MA

## 2023-04-14 PROCEDURE — 87086 URINE CULTURE/COLONY COUNT: CPT

## 2023-04-14 PROCEDURE — 87637 SARSCOV2&INF A&B&RSV AMP PRB: CPT

## 2023-04-14 PROCEDURE — 85025 COMPLETE CBC W/AUTO DIFF WBC: CPT

## 2023-04-14 PROCEDURE — 84702 CHORIONIC GONADOTROPIN TEST: CPT

## 2023-04-14 PROCEDURE — 99284 EMERGENCY DEPT VISIT MOD MDM: CPT | Mod: 25

## 2023-04-14 PROCEDURE — 81001 URINALYSIS AUTO W/SCOPE: CPT

## 2023-04-14 PROCEDURE — 80053 COMPREHEN METABOLIC PANEL: CPT

## 2023-04-14 RX ORDER — ONDANSETRON 8 MG/1
4 TABLET, FILM COATED ORAL ONCE
Refills: 0 | Status: COMPLETED | OUTPATIENT
Start: 2023-04-14 | End: 2023-04-14

## 2023-04-14 RX ORDER — ACETAMINOPHEN 500 MG
975 TABLET ORAL ONCE
Refills: 0 | Status: COMPLETED | OUTPATIENT
Start: 2023-04-14 | End: 2023-04-14

## 2023-04-14 RX ORDER — SODIUM CHLORIDE 9 MG/ML
1000 INJECTION INTRAMUSCULAR; INTRAVENOUS; SUBCUTANEOUS ONCE
Refills: 0 | Status: COMPLETED | OUTPATIENT
Start: 2023-04-14 | End: 2023-04-14

## 2023-04-14 RX ADMIN — ONDANSETRON 4 MILLIGRAM(S): 8 TABLET, FILM COATED ORAL at 04:30

## 2023-04-14 RX ADMIN — Medication 975 MILLIGRAM(S): at 04:30

## 2023-04-14 RX ADMIN — Medication 975 MILLIGRAM(S): at 05:00

## 2023-04-14 RX ADMIN — SODIUM CHLORIDE 1000 MILLILITER(S): 9 INJECTION INTRAMUSCULAR; INTRAVENOUS; SUBCUTANEOUS at 04:30

## 2023-04-14 NOTE — ED PROVIDER NOTE - PATIENT PORTAL LINK FT
You can access the FollowMyHealth Patient Portal offered by [facility] by registering at the following website: [email or web address]. By joining [employer]’s FollowMyHealth portal, you will also be able to view your health information using other applications (apps) compatible with our system.

## 2023-04-14 NOTE — ED PROVIDER NOTE - PROGRESS NOTE DETAILS
Mitul PGY 2 Patient tolerated p.o. states she is feeling better patient aware of CAT scan results.  Patient is aware of the malrotation.

## 2023-04-14 NOTE — ED PROVIDER NOTE - NSTIMEPROVIDERCAREINITIATE_GEN_ER
Left a voice message that his lab looks good and will repeat in 6 months, as well as patient can call us back at    14-Apr-2023 03:34

## 2023-04-14 NOTE — ED PROVIDER NOTE - PHYSICAL EXAMINATION
GENERAL: Awake, alert, NAD  LUNGS: CTAB, no wheezes or crackles   CARDIAC: RRR, no m/r/g  ABDOMEN: Soft, ,Left lower quadrant tenderness right upper quadrant tenderness  non distended, no rebound, no guarding  BACK: No midline spinal tenderness, no CVA tenderness  EXT: No edema, no calf tenderness, 2+ DP pulses bilaterally, no deformities.  NEURO: A&Ox3. Moving all extremities.  SKIN: Warm and dry. No rash.  PSYCH: Normal affect.

## 2023-04-14 NOTE — ED PROVIDER NOTE - ATTENDING CONTRIBUTION TO CARE
42-year-old female with history of emergency  and diverticulitis in the past presents with abdominal pain since a day ago describes the pain as crampy intermittent and over her abdomen with some radiation to the back as some nausea and vomited once but no blood.  Had no diarrhea and no urinary symptoms no fever chills no chest pain no shortness of breath no sick contacts no recent travel  Not a smoker and no alcohol use physical exam notable for well-appearing no acute distress comfortable and pleasant neuro grossly intact clear lungs S1-S2, soft nontender abdomen except the right upper quadrant area with negative West sign no CVA tenderness and well-perfused no peripheral edema  Concern for diverticulitis versus gastritis or gastroenteritis plan for labs imaging and symptomatic control if work-up is negative can go home with GI follow-up

## 2023-04-14 NOTE — ED PROVIDER NOTE - CLINICAL SUMMARY MEDICAL DECISION MAKING FREE TEXT BOX
42-year-old female chief complaint abdominal pain.  History and physical clinical concern for possible diverticulitis vs pancreatitis .  Will assess with labs and imaging.  Dispo pending results

## 2023-04-14 NOTE — ED PROVIDER NOTE - NSFOLLOWUPINSTRUCTIONS_ED_ALL_ED_FT
Today you were seen in the ED for abdominal pain.    It was found that you have no signs of a medical emergency on today exam.     You can take Tylenol every 8 hours for pain as needed with Pepcid.     Please follow up with your gastroenterologists / PCP for continued care and management.     [address] [place] [id number]  [phone]    Please return to the ED if you have an increase in abdominal pain, nausea, vomiting or pain that is uncontrolled with your home pain relief.

## 2023-04-14 NOTE — ED ADULT TRIAGE NOTE - BP NONINVASIVE DIASTOLIC (MM HG)
Rotation Flap Text: The defect edges were debeveled with a #15 scalpel blade.  Given the location of the defect, shape of the defect and the proximity to free margins a rotation flap was deemed most appropriate.  Using a sterile surgical marker, an appropriate rotation flap was drawn incorporating the defect and placing the expected incisions within the relaxed skin tension lines where possible.    The area thus outlined was incised deep to adipose tissue with a #15 scalpel blade.  The skin margins were undermined to an appropriate distance in all directions utilizing iris scissors. 85

## 2023-04-14 NOTE — ED PROVIDER NOTE - OBJECTIVE STATEMENT
42-year-old female chief complaint abdominal pain.  History of diverticulitis prior abdominal surgery .  Patient notes symptoms of over the past 3 days worsening today.  Pain localized to the left lower quadrant and right upper quadrant.  Patient has mild nausea no vomiting one episode of diarrhea yesterday.  Patient notes that symptoms are similar to her prior episode of diverticulitis

## 2023-04-15 LAB
CULTURE RESULTS: SIGNIFICANT CHANGE UP
SPECIMEN SOURCE: SIGNIFICANT CHANGE UP

## 2023-06-09 NOTE — OB NEONATOLOGY/PEDIATRICIAN DELIVERY SUMMARY - NSSUCTIONBYPEDSA_OBGYN_ALL_OB
Patient comes from home with complaints of right sided abdominal pain that radiates to the back on both sides.    Nose/Mouth/Pharynx

## 2024-03-21 ENCOUNTER — NON-APPOINTMENT (OUTPATIENT)
Age: 44
End: 2024-03-21

## 2024-05-20 ENCOUNTER — APPOINTMENT (OUTPATIENT)
Dept: PULMONOLOGY | Facility: CLINIC | Age: 44
End: 2024-05-20

## 2024-05-21 NOTE — OB PROVIDER TRIAGE NOTE - NSHPLABSRESULTS_GEN_ALL_CORE
Chief Complaint   Patient presents with    Sore Throat     Had strep two weeks ago. Finished antibiotics on the [address] since last night.        HISTORY OF PRESENT ILLNESS:   Patient is a 33 year old , female who presents in no acute distress for concerns of sore throat that started yesterday.  Patient states that she was recently treated for strep throat 2 weeks ago and finished antibiotics on 5/11/2024.  Patient did just recently moved here from [place].  She does report that she change her toothbrush but is unsure if she washed her water bottles thoroughly enough.  Concerned about reinfection of strep.  Denies fever, nausea, vomiting, cough, congestion, dizziness, syncope.    I have reviewed the patient's medications and allergies, past medical, surgical, social and family history, updating these as appropriate.  See histories section of the EMR (electronic medical record) for a display of this information.    ROS: 5 point review of systems negative unless otherwise noted in HPI.    PHYSICAL EXAM:   CONSTITUTIONAL:  Well-developed, well-nourished patient who is awake, alert and in no acute distress  HEAD NECK/FACE:  Normocephalic, atraumatic  ENT:  Nares patent. No nasal discharge, no septal abnormalities noted. Tympanic membranes are normal bilaterally. External auditory canals are clear bilaterally. Oropharynx with erythema and 2+ swelling.  But no exudate, peritonsillar mass, obstructive process  Uvula midline. Mucous membranes pink and moist.  NECK:  Trachea midline with my cervical lymphadenopathy. Supple with full range of motion. No nuchal rigidity or vertebral tenderness noted. No meningismus  SKIN:  Visible skin without rashes, pallor or cyanosis    ASSESSMENT:  1. Strep pharyngitis    2. Sore throat        MDM; Multiple diagnoses considered on this patient.  All vitals and testing reviewed.  At this point time patient does just wanted make sure that she does not have repeat strep infection.  I did  obtain strep swab at this time and patient will be contacted with results and placed on appropriate antibiotic if needed.  We did discuss supportive measures in the meantime if negative for sore throat care.  Positive for strep at this time.  We will send prescription for Zithromax to pharmacy of choice.  I did contact patient via phone she verbalized understanding.  We will be starting patient on Zithromax due to amoxicillin allergy.    PLAN:    Orders Placed This Encounter    POC Streptococcus Group A PCR    azithromycin (Zithromax) 500 MG tablet       Take full course of antibiotics as prescribed.    Use over-the-counter probiotics or yogurt daily to help avoid GI upset  Push fluids, stay well hydrated.    Alternate Tylenol and ibuprofen every 4-6 hours as needed for pain control.    May use over-the-counter Chloraseptic throat spray, throat lozenges, honey, saltwater gargles to help soothe throat.    You will be contacted with results strep test when it returns.                                 [facility]:                        11.2   9.33  )-----------( 215      ( 2019 17:15 )             33.3   Urinalysis Basic - ( 2019 17:15 )  Color: YELLOW / Appearance: Lt TURBID / S.012 / pH: 6.5  Gluc: NEGATIVE / Ketone: NEGATIVE  / Bili: NEGATIVE / Urobili: NORMAL   Blood: NEGATIVE / Protein: 10 / Nitrite: NEGATIVE   Leuk Esterase: NEGATIVE / RBC: 3-5 / WBC 6-10   Sq Epi: FEW / Non Sq Epi: x / Bacteria: FEW  PCR:0.19      137  |  103  |  12  ----------------------------<  87  3.8   |  19<L>  |  0.68    Ca    9.2      2019 17:15    TPro  6.6  /  Alb  3.6  /  TBili  0.2  /  DBili  x   /  AST  20  /  ALT  20  /  AlkPhos  186<H>    Uric Acid: 5.5  PT:9.5  INR:0.84  Fibrinogen Assay:720.0  Lactate Dehydrogenase:151  PT:

## 2024-06-05 ENCOUNTER — EMERGENCY (EMERGENCY)
Facility: HOSPITAL | Age: 44
LOS: 1 days | Discharge: HOME | End: 2024-06-05
Attending: STUDENT IN AN ORGANIZED HEALTH CARE EDUCATION/TRAINING PROGRAM
Payer: COMMERCIAL

## 2024-06-05 VITALS
RESPIRATION RATE: 18 BRPM | OXYGEN SATURATION: 100 % | HEART RATE: 97 BPM | WEIGHT: 190.04 LBS | DIASTOLIC BLOOD PRESSURE: 81 MMHG | HEIGHT: 66 IN | SYSTOLIC BLOOD PRESSURE: 130 MMHG | TEMPERATURE: 98 F

## 2024-06-05 DIAGNOSIS — R87.89 OTHER ABNORMAL FINDINGS IN SPECIMENS FROM FEMALE GENITAL ORGANS: Chronic | ICD-10-CM

## 2024-06-05 PROCEDURE — 99285 EMERGENCY DEPT VISIT HI MDM: CPT

## 2024-06-05 NOTE — ED ADULT TRIAGE NOTE - CHIEF COMPLAINT QUOTE
abdominal pain L sided radiating to R side past few days. hx diverticulitis, states this feels similar.

## 2024-06-06 VITALS
DIASTOLIC BLOOD PRESSURE: 69 MMHG | HEART RATE: 74 BPM | TEMPERATURE: 98 F | RESPIRATION RATE: 18 BRPM | SYSTOLIC BLOOD PRESSURE: 110 MMHG | OXYGEN SATURATION: 100 %

## 2024-06-06 LAB
ALBUMIN SERPL ELPH-MCNC: 4.5 G/DL — SIGNIFICANT CHANGE UP (ref 3.3–5)
ALP SERPL-CCNC: 73 U/L — SIGNIFICANT CHANGE UP (ref 40–120)
ALT FLD-CCNC: 14 U/L — SIGNIFICANT CHANGE UP (ref 10–45)
ANION GAP SERPL CALC-SCNC: 15 MMOL/L — SIGNIFICANT CHANGE UP (ref 5–17)
APPEARANCE UR: CLEAR — SIGNIFICANT CHANGE UP
AST SERPL-CCNC: 14 U/L — SIGNIFICANT CHANGE UP (ref 10–40)
BASE EXCESS BLDV CALC-SCNC: -0.1 MMOL/L — SIGNIFICANT CHANGE UP (ref -2–3)
BASOPHILS # BLD AUTO: 0.06 K/UL — SIGNIFICANT CHANGE UP (ref 0–0.2)
BASOPHILS NFR BLD AUTO: 0.7 % — SIGNIFICANT CHANGE UP (ref 0–2)
BILIRUB SERPL-MCNC: 0.5 MG/DL — SIGNIFICANT CHANGE UP (ref 0.2–1.2)
BILIRUB UR-MCNC: NEGATIVE — SIGNIFICANT CHANGE UP
BUN SERPL-MCNC: 15 MG/DL — SIGNIFICANT CHANGE UP (ref 7–23)
CA-I SERPL-SCNC: 1.25 MMOL/L — SIGNIFICANT CHANGE UP (ref 1.15–1.33)
CALCIUM SERPL-MCNC: 9.4 MG/DL — SIGNIFICANT CHANGE UP (ref 8.4–10.5)
CHLORIDE BLDV-SCNC: 104 MMOL/L — SIGNIFICANT CHANGE UP (ref 96–108)
CHLORIDE SERPL-SCNC: 104 MMOL/L — SIGNIFICANT CHANGE UP (ref 96–108)
CO2 BLDV-SCNC: 27 MMOL/L — HIGH (ref 22–26)
CO2 SERPL-SCNC: 21 MMOL/L — LOW (ref 22–31)
COLOR SPEC: YELLOW — SIGNIFICANT CHANGE UP
CREAT SERPL-MCNC: 0.57 MG/DL — SIGNIFICANT CHANGE UP (ref 0.5–1.3)
DIFF PNL FLD: NEGATIVE — SIGNIFICANT CHANGE UP
EGFR: 116 ML/MIN/1.73M2 — SIGNIFICANT CHANGE UP
EOSINOPHIL # BLD AUTO: 0.21 K/UL — SIGNIFICANT CHANGE UP (ref 0–0.5)
EOSINOPHIL NFR BLD AUTO: 2.4 % — SIGNIFICANT CHANGE UP (ref 0–6)
GAS PNL BLDV: 135 MMOL/L — LOW (ref 136–145)
GAS PNL BLDV: SIGNIFICANT CHANGE UP
GLUCOSE BLDV-MCNC: 88 MG/DL — SIGNIFICANT CHANGE UP (ref 70–99)
GLUCOSE SERPL-MCNC: 94 MG/DL — SIGNIFICANT CHANGE UP (ref 70–99)
GLUCOSE UR QL: NEGATIVE MG/DL — SIGNIFICANT CHANGE UP
HCO3 BLDV-SCNC: 26 MMOL/L — SIGNIFICANT CHANGE UP (ref 22–29)
HCT VFR BLD CALC: 36.7 % — SIGNIFICANT CHANGE UP (ref 34.5–45)
HCT VFR BLDA CALC: 37 % — SIGNIFICANT CHANGE UP (ref 34.5–46.5)
HGB BLD CALC-MCNC: 12.2 G/DL — SIGNIFICANT CHANGE UP (ref 11.7–16.1)
HGB BLD-MCNC: 11.8 G/DL — SIGNIFICANT CHANGE UP (ref 11.5–15.5)
IMM GRANULOCYTES NFR BLD AUTO: 0.3 % — SIGNIFICANT CHANGE UP (ref 0–0.9)
KETONES UR-MCNC: ABNORMAL MG/DL
LACTATE BLDV-MCNC: 0.6 MMOL/L — SIGNIFICANT CHANGE UP (ref 0.5–2)
LEUKOCYTE ESTERASE UR-ACNC: NEGATIVE — SIGNIFICANT CHANGE UP
LIDOCAIN IGE QN: 38 U/L — SIGNIFICANT CHANGE UP (ref 7–60)
LYMPHOCYTES # BLD AUTO: 2.88 K/UL — SIGNIFICANT CHANGE UP (ref 1–3.3)
LYMPHOCYTES # BLD AUTO: 33.5 % — SIGNIFICANT CHANGE UP (ref 13–44)
MCHC RBC-ENTMCNC: 28.9 PG — SIGNIFICANT CHANGE UP (ref 27–34)
MCHC RBC-ENTMCNC: 32.2 GM/DL — SIGNIFICANT CHANGE UP (ref 32–36)
MCV RBC AUTO: 90 FL — SIGNIFICANT CHANGE UP (ref 80–100)
MONOCYTES # BLD AUTO: 0.69 K/UL — SIGNIFICANT CHANGE UP (ref 0–0.9)
MONOCYTES NFR BLD AUTO: 8 % — SIGNIFICANT CHANGE UP (ref 2–14)
NEUTROPHILS # BLD AUTO: 4.72 K/UL — SIGNIFICANT CHANGE UP (ref 1.8–7.4)
NEUTROPHILS NFR BLD AUTO: 55.1 % — SIGNIFICANT CHANGE UP (ref 43–77)
NITRITE UR-MCNC: NEGATIVE — SIGNIFICANT CHANGE UP
NRBC # BLD: 0 /100 WBCS — SIGNIFICANT CHANGE UP (ref 0–0)
PCO2 BLDV: 47 MMHG — HIGH (ref 39–42)
PH BLDV: 7.35 — SIGNIFICANT CHANGE UP (ref 7.32–7.43)
PH UR: 5.5 — SIGNIFICANT CHANGE UP (ref 5–8)
PLATELET # BLD AUTO: 253 K/UL — SIGNIFICANT CHANGE UP (ref 150–400)
PO2 BLDV: 20 MMHG — LOW (ref 25–45)
POTASSIUM BLDV-SCNC: 3.8 MMOL/L — SIGNIFICANT CHANGE UP (ref 3.5–5.1)
POTASSIUM SERPL-MCNC: 3.9 MMOL/L — SIGNIFICANT CHANGE UP (ref 3.5–5.3)
POTASSIUM SERPL-SCNC: 3.9 MMOL/L — SIGNIFICANT CHANGE UP (ref 3.5–5.3)
PROT SERPL-MCNC: 7.7 G/DL — SIGNIFICANT CHANGE UP (ref 6–8.3)
PROT UR-MCNC: NEGATIVE MG/DL — SIGNIFICANT CHANGE UP
RBC # BLD: 4.08 M/UL — SIGNIFICANT CHANGE UP (ref 3.8–5.2)
RBC # FLD: 13.2 % — SIGNIFICANT CHANGE UP (ref 10.3–14.5)
SAO2 % BLDV: 29.4 % — LOW (ref 67–88)
SODIUM SERPL-SCNC: 140 MMOL/L — SIGNIFICANT CHANGE UP (ref 135–145)
SP GR SPEC: 1.01 — SIGNIFICANT CHANGE UP (ref 1–1.03)
UROBILINOGEN FLD QL: 0.2 MG/DL — SIGNIFICANT CHANGE UP (ref 0.2–1)
WBC # BLD: 8.59 K/UL — SIGNIFICANT CHANGE UP (ref 3.8–10.5)
WBC # FLD AUTO: 8.59 K/UL — SIGNIFICANT CHANGE UP (ref 3.8–10.5)

## 2024-06-06 PROCEDURE — 36000 PLACE NEEDLE IN VEIN: CPT | Mod: XU

## 2024-06-06 PROCEDURE — 93005 ELECTROCARDIOGRAM TRACING: CPT

## 2024-06-06 PROCEDURE — 84295 ASSAY OF SERUM SODIUM: CPT

## 2024-06-06 PROCEDURE — 82330 ASSAY OF CALCIUM: CPT

## 2024-06-06 PROCEDURE — 82803 BLOOD GASES ANY COMBINATION: CPT

## 2024-06-06 PROCEDURE — 82947 ASSAY GLUCOSE BLOOD QUANT: CPT

## 2024-06-06 PROCEDURE — 74177 CT ABD & PELVIS W/CONTRAST: CPT | Mod: MC

## 2024-06-06 PROCEDURE — 74177 CT ABD & PELVIS W/CONTRAST: CPT | Mod: 26,MC

## 2024-06-06 PROCEDURE — 85014 HEMATOCRIT: CPT

## 2024-06-06 PROCEDURE — 83690 ASSAY OF LIPASE: CPT

## 2024-06-06 PROCEDURE — 82435 ASSAY OF BLOOD CHLORIDE: CPT

## 2024-06-06 PROCEDURE — 80053 COMPREHEN METABOLIC PANEL: CPT

## 2024-06-06 PROCEDURE — 84132 ASSAY OF SERUM POTASSIUM: CPT

## 2024-06-06 PROCEDURE — 85018 HEMOGLOBIN: CPT

## 2024-06-06 PROCEDURE — 83605 ASSAY OF LACTIC ACID: CPT

## 2024-06-06 PROCEDURE — 81003 URINALYSIS AUTO W/O SCOPE: CPT

## 2024-06-06 PROCEDURE — 99285 EMERGENCY DEPT VISIT HI MDM: CPT | Mod: 25

## 2024-06-06 PROCEDURE — 87086 URINE CULTURE/COLONY COUNT: CPT

## 2024-06-06 PROCEDURE — 85025 COMPLETE CBC W/AUTO DIFF WBC: CPT

## 2024-06-06 RX ORDER — ACETAMINOPHEN 500 MG
975 TABLET ORAL ONCE
Refills: 0 | Status: COMPLETED | OUTPATIENT
Start: 2024-06-06 | End: 2024-06-06

## 2024-06-06 RX ADMIN — Medication 975 MILLIGRAM(S): at 02:50

## 2024-06-06 NOTE — ED PROVIDER NOTE - OBJECTIVE STATEMENT
Subjective:      History was provided by the mother. Reed Spencer is a 13 m.o. male who is brought in by his mother for this well child visit. Birth History    Birth     Weight: 5 lb 6.8 oz (2.46 kg)    Apgar     One: 8     Five: 9    Discharge Weight: 5 lb 1 oz (2.295 kg)    Delivery Method: Vaginal, Spontaneous Delivery    Gestation Age: 40 1/7 wks    Days in Hospital: 2     Maternal gestational DM, gestational HTN, pre-eclampsia, gestational, gestational thrombocytopenia    Passed  hearing screening  Passed Critical Congenital Heart Disease Screening  [address] normal     Immunization History   Administered Date(s) Administered    DTaP/Hib/IPV (Pentacel) 2017, 2017, 2018    Hepatitis A Ped/Adol (Havrix) 2018    Hepatitis B Ped/Adol (Engerix-B) 2017, 2018    Hepatitis B, unspecified formulation 2017    Influenza, Quadv, 6-35 months, IM, PF (Fluzone) 2018, 2018    MMR 2018    Pneumococcal 13-valent Conjugate (Avril Chi) 2017, 2017, 2018    Rotavirus Pentavalent (RotaTeq) 2017, 2017, 2018    Varicella (Varivax) 2018     Patient's medications, allergies, past medical, surgical, social and family histories were reviewed and updated as appropriate. CC: well  Just wants to be sure labs are ok since was told he had anemia. Discussed his lead was not high, though.  phone was utilized. All questions answered. No fever, cough, congestion, rash, constipation,  Does not drink milk but does eat cheese daily. Does eat meats and chicken daily. Does not like fish. Current Issues:  Current concerns on the part of Frandy's mother include none att his time . Review of Nutrition:  Current diet: Patient is eating from all 5 food groups;  breast milk, Juice- yes-  1/2 cup a day, Water-yes  Balanced diet?  yes  Difficulties with feeding? no    Concerns about going to the bathroom-
see mdm

## 2024-06-06 NOTE — ED PROVIDER NOTE - ATTENDING CONTRIBUTION TO CARE
I have personally performed a face to face medical and diagnostic evaluation of the patient. I have discussed with and reviewed the Resident's note and agree with the History, ROS, Physical Exam and MDM unless otherwise indicated. A brief summary of my personal evaluation and impression can be found below.     44-year-old female, presenting with chief complaint of lower abdominal for the past 3 to 4 days without any associated symptoms.  States that she has a history of uncomplicated diverticulitis and this feels similar to a milder form of episodes in the past.  On exam, vital signs stable, reproducible tenderness to palpation of the left lower quadrant.  Otherwise unremarkable.  Plan for CT of the abdomen pelvis with IV contrast, labs to assess abdominal enzymes and electrolytes.  Reassess to dispo. Myesha Mccoy, ED Attending

## 2024-06-06 NOTE — ED PROVIDER NOTE - PATIENT PORTAL LINK FT
You can access the FollowMyHealth Patient Portal offered by [facility] by registering at the following website: [email or web address]. By joining [employer]s FollowMyHealth portal, you will also be able to view your health information using other applications (apps) compatible with our system.

## 2024-06-06 NOTE — ED PROVIDER NOTE - NSFOLLOWUPINSTRUCTIONS_ED_ALL_ED_FT
Please make sure to follow up with your primary care doctor within 1-2 days.    Bring a copy of all of your results with you to your follow up appointments.   Return to the ER as discussed if you develop any new or worsening symptoms.       Make sure to rest and adequately hydrate.

## 2024-06-06 NOTE — ED PROVIDER NOTE - CLINICAL SUMMARY MEDICAL DECISION MAKING FREE TEXT BOX
43 yo F no pmhx pw lower abd pain 4 days without nausea, vomiting, fevers, chills, diarrhea, dysuria, vaginal bleeding. States it feels similar to prior pain with diverticulitis. Pain is ongoing. No surgical history, allergies to ciprofloxacin and flagyl. VSS - lower abd ttp on exam bilateral.. Will obtain labs, CTAP to ro diverticulitis, appendicitis. UA to rule out UTI.

## 2024-06-06 NOTE — ED PROVIDER NOTE - PROGRESS NOTE DETAILS
Pt received at sign out, no abd pain at present. CT results reviewed with pt, will f/u with PMD. Copy provided. Offered PO chall but wants to leave, states she has to go  her children. Adequate hydration recommended. Patient stable for discharge.  Strict follow up instructions given, return to ED precautions reviewed. Importance of follow up emphasized, patient verbalized understanding.  All questions answered. Eugenia Rivas PA-C

## 2024-06-06 NOTE — ED ADULT NURSE NOTE - OBJECTIVE STATEMENT
43 y/o F A&Ox4 with PMH of Diverticulitis presents to the ED c/o abdominal pain. Pt reports intermittent b/l lower abdominal pain for the past 4-5 days; pt reports pain travels between her L and R side. Pt endorses some nausea; denies vomiting. Pt presents to the ED due to worsening pain. Denies chest pain, SOB, v/d, lightheadedness, dizziness, fever, chills. IV access established; 20 G L AC. Patient safety maintained, bed is in lowest position, wheels locked, and side rails raised.

## 2024-06-07 LAB
CULTURE RESULTS: SIGNIFICANT CHANGE UP
SPECIMEN SOURCE: SIGNIFICANT CHANGE UP

## 2024-06-17 ENCOUNTER — APPOINTMENT (OUTPATIENT)
Dept: PULMONOLOGY | Facility: CLINIC | Age: 44
End: 2024-06-17

## 2024-07-08 ENCOUNTER — APPOINTMENT (OUTPATIENT)
Dept: PULMONOLOGY | Facility: CLINIC | Age: 44
End: 2024-07-08
Payer: COMMERCIAL

## 2024-07-08 VITALS
HEIGHT: 66 IN | HEART RATE: 107 BPM | BODY MASS INDEX: 31.34 KG/M2 | OXYGEN SATURATION: 98 % | SYSTOLIC BLOOD PRESSURE: 119 MMHG | DIASTOLIC BLOOD PRESSURE: 82 MMHG | RESPIRATION RATE: 16 BRPM | WEIGHT: 195 LBS

## 2024-07-08 DIAGNOSIS — R06.02 SHORTNESS OF BREATH: ICD-10-CM

## 2024-07-08 DIAGNOSIS — F41.9 ANXIETY DISORDER, UNSPECIFIED: ICD-10-CM

## 2024-07-08 DIAGNOSIS — R91.1 SOLITARY PULMONARY NODULE: ICD-10-CM

## 2024-07-08 DIAGNOSIS — Z78.9 OTHER SPECIFIED HEALTH STATUS: ICD-10-CM

## 2024-07-08 DIAGNOSIS — G47.9 SLEEP DISORDER, UNSPECIFIED: ICD-10-CM

## 2024-07-08 DIAGNOSIS — R00.2 PALPITATIONS: ICD-10-CM

## 2024-07-08 PROCEDURE — 94729 DIFFUSING CAPACITY: CPT

## 2024-07-08 PROCEDURE — 94060 EVALUATION OF WHEEZING: CPT

## 2024-07-08 PROCEDURE — 99204 OFFICE O/P NEW MOD 45 MIN: CPT | Mod: 25

## 2024-07-08 PROCEDURE — 94726 PLETHYSMOGRAPHY LUNG VOLUMES: CPT

## 2024-07-09 LAB — EOSINOPHIL # BLD MANUAL: 60 /UL

## 2024-07-11 ENCOUNTER — NON-APPOINTMENT (OUTPATIENT)
Age: 44
End: 2024-07-11

## 2024-07-15 ENCOUNTER — NON-APPOINTMENT (OUTPATIENT)
Age: 44
End: 2024-07-15

## 2024-07-15 LAB — TOTAL IGE SMQN RAST: 227 KU/L

## 2024-08-21 ENCOUNTER — APPOINTMENT (OUTPATIENT)
Dept: PULMONOLOGY | Facility: CLINIC | Age: 44
End: 2024-08-21

## 2024-09-16 ENCOUNTER — APPOINTMENT (OUTPATIENT)
Dept: PULMONOLOGY | Facility: CLINIC | Age: 44
End: 2024-09-16

## 2024-09-16 NOTE — PHYSICAL EXAM
[No Acute Distress] : no acute distress [Normal Oropharynx] : normal oropharynx [Normal Appearance] : normal appearance [No Neck Mass] : no neck mass [Normal Rate/Rhythm] : normal rate/rhythm [Normal S1, S2] : normal s1, s2 [No Resp Distress] : no resp distress [Clear to Auscultation Bilaterally] : clear to auscultation bilaterally [No Abnormalities] : no abnormalities [Benign] : benign [Normal Gait] : normal gait [No Clubbing] : no clubbing [No Cyanosis] : no cyanosis [No Edema] : no edema [Normal Color/ Pigmentation] : normal color/ pigmentation [No Focal Deficits] : no focal deficits [Oriented x3] : oriented x3 [Normal Affect] : normal affect [TextBox_140] : Anxious/tearful

## 2024-09-16 NOTE — REASON FOR VISIT
[Initial] : an initial visit [Pulmonary Nodules] : pulmonary nodules [TextBox_44] : shortness of breath

## 2024-09-16 NOTE — ASSESSMENT
[FreeTextEntry1] : Patient presents today for follow-up of known pulmonary nodules and concern over SOB. She explains she has been dealing with many overwhelming situations lately, has anxiety which she attributes her SOB to. Reports poor sleep recently as well, hx of bruxiusm and TMJ. On exam, lungs are clear, in NAD, appears tearful/crying about current stressors. Provided active listening. Care plans discussed - in office PFT revealed normal lung function, given hx of pulmonary nodule she is ordered for CT Chest repeat. Sent for home sleep evaluation. Provided with Levalbuterol PRN to use as needed for SOB. Follow-up in 3 months.

## 2025-01-27 ENCOUNTER — APPOINTMENT (OUTPATIENT)
Dept: PULMONOLOGY | Facility: CLINIC | Age: 45
End: 2025-01-27

## 2025-05-21 NOTE — OB PROVIDER TRIAGE NOTE - NS_DISPOSITION_OBGYN_ALL_OB
Chief complaint:   BIV ICD check      HISTORY OF PRESENT ILLNESS     Ely Mercado, 58 year old presents today for routine follow up for BIVICD check. She has a history of nonischemic cardiomyopathy and had her device implanted 2011 with most recent generator change 1/2023.   Ely was lost to f/u after generator change until now.  Patient denies chest pain, SOB, palpitations, dizziness, edema, BATEMAN, orthopnea, syncope. She had some hair loss from stress, went through a divorce and did not have insurance last year.   She had a scare while camping and felt her heart racing. She had a sleep study and now is having a new machine. She has lost 25 lbs since the last visit. She has had some possible blood clots or tonsil stones that she coughed out and saw ENT. She has nasal sinus drainage allergies. She has possible asthma. She had a cyst on the palm of the hand with removal a month or two ago.   She has bunions and her feet that hurt.     Other significant problems:  Patient Active Problem List    Diagnosis Date Noted   • Lumbar radiculopathy 11/08/2016     Priority: Medium   • Facet arthropathy, lumbosacral 11/08/2016     Priority: Medium   • Stenosing ten[facility]ynov[facility] of finger, [facility] 04/14/2025     Priority: Low   • Ganglion cyst of flexor tendon sheath, [facility] 04/14/2025     Priority: Low   • Obesity, morbid  (CMD) 02/02/2021     Priority: Low     Consulted because the patient's mother had adrenal mass and primary service is wondering whether patient has \"some issue\" which is similar that is causing her well controlled T2DM and obesity.  Per patient mother has pituitary \"cyst\" and adrenal \"cyst.\"     Purple stretch marks  No  Darkening of skin  No  Kidney stones  No  Poorly controlled HTN  Yes  Sustained blood pressure > 150/100 mm Hg, confirmed on separate days: No  Blood pressure > 140/90 mm Hg on three antihypertensive medications, including a diuretic: No  Blood pressure <140/90 mm Hg requiring four or more  antihypertensive medications: No  Easy bruising  No  Osteoporosis or Fracture History  No, nose playing football as child, toes while kicking things, elbow rollerskating at age 19 yo  Facial Plethora  Yes  Proximal Myopathy Yes  History of low Potassium: Per patient in 2016.  The patient had ear issues and was put on steroid at that time.   Recent corticosteroid administration (pills or injection)  No pills nor injection lately.  She is using steroid.      • Statin intolerance 02/18/2020     Priority: Low   • DUB (dysfunctional uterine bleeding)      Priority: Low   • Pelvic pain in female      Priority: Low   • Polycystic ovarian syndrome      Priority: Low   • LIONEL on CPAP 03/27/2019     Priority: Low     On this therapy since 2011.  She downloads frequently, but this is assessing her adherence, not if settings are ideal.  She still has daytime somnolence, fatigue and can easily fall asleep if left alone.  No AM HA.      • Dry skin 03/26/2019     Priority: Low     This issue and constipation make patient concerned about thyroid issue ongoing.  Last year had TSH which was normal.    TSH (mcUnits/mL)   Date Value   04/08/2019 1.085   04/13/2018 1.372   03/29/2016 1.679   08/28/2015 1.554   02/28/2012 3.215   12/17/2011 2.782   07/27/2011 3.071     She is getting periodic steroid injections in the epidural space for pain issues.  Last injection 3/12/2019.  She received a medrol dose pack at the end of February, 2019.      Cold intolerance She feels pretty cold in cold weather, but not in room temperature where she is fine  Constipation Yes (ongoing 10 years, BM daily with miralax)  Bradycardia: No (on beta-blocker)  Muscle weakness: Occasionally  Anemia History:  Yes, years ago, but not recently   HTN: No  Hyperlipidemia: Yes  History of thyroid US:  No  History of thyroid nodules  No  History of thyroid biopsy No  History of thyroid surgery  No  Family history of thyroid disease:  Yes,mother with  hypothyroidism  Family history of thyroid cancer:  No  History of radiation treatment before age 17:   No  Menses features: IUD place 5 years ago, changed 3/2018.  Periods before than 3 weeks/month menstrual bleeding    Heat intolerance:  At times  History of tachycardia  No anxiety.   No history of head trauma.  No concussion history.  No tremors.  No weight loss that is unattributable to diet and exercise  She gets steroid injection therapy 1 x year for past 3 years, oral agent 1 x a for past 3 years.   She would get periodic steroid pills for URI, medrol dose pack would be longest duration.      • Nonsustained ventricular tachycardia  (CMD) 11/03/2017     Priority: Low   • Type 2 diabetes mellitus without complication, without long-term current use of insulin (CMD)      Priority: Low   • Lumbar degenerative disc disease 11/08/2016     Priority: Low   • Spinal stenosis at L4-L5 level 11/08/2016     Priority: Low   • Chronic fatigue disorder 01/14/2016     Priority: Low   • Insulin resistance syndrome 01/06/2015     Priority: Low   • Personal history of other disorders of nervous system and sense organs 07/31/2013     Priority: Low     Amblyopia as a child     • Myopia 07/31/2013     Priority: Low   • Presbyopia 07/31/2013     Priority: Low   • OA (osteoarthritis) of knee 10/16/2012     Priority: Low   • Nonischemic cardiomyopathy  (CMD)      Priority: Low     dilated (diagnosed in  07/2011)     • Left bundle branch block      Priority: Low   • CHF (congestive heart failure)  (CMD)      Priority: Low   • ICD (implantable cardioverter-defibrillator) in place 08/03/2011     Priority: Low     Has [facility] for remote follow up. TURN ON [employer]EL[employer] ALERT FOR # OF SHOCKS DELIVERED IN AN EPISODE.     02/2021 Medtronic early battery depletion alert           PAST MEDICAL, FAMILY AND SOCIAL HISTORY     Current Outpatient Medications   Medication Sig   • potassium CHLORIDE (KLOR-CON M) 20 MEQ nieves ER tablet Take 1 tablet by  mouth daily. Need appointment for further refills   • carvedilol (COREG) 25 MG tablet Take 1 tablet by mouth in the morning and 1 tablet in the evening.   • hydrOXYzine (ATARAX) 25 MG tablet Take 1 tablet by mouth nightly as needed (insomnia). (Patient not taking: Reported on 5/21/2025)   • ibuprofen (MOTRIN) 800 MG tablet Take 1 tablet by mouth every 8 hours as needed for Pain.   • albuterol 108 (90 Base) MCG/ACT inhaler Inhale 2 puffs into the lungs every 4 hours as needed for Wheezing.   • fluticasone (FLONASE) 50 MCG/ACT nasal spray Spray 2 sprays in each nostril daily.   • guaiFENesin (MUCINEX) 600 MG 12 hr tablet Take 1 tablet by mouth in the morning and 1 tablet in the evening.   • Biotin 1 MG Cap    • COLLAGEN PO    • spironolactone (ALDACTONE) 25 MG tablet Take 1 tablet by mouth daily.   • lisinopril (ZESTRIL) 5 MG tablet Take 1 tablet by mouth daily.   • Multiple Vitamins-Minerals (Multivitamin Adults 50+) Tab    • Turmeric 400 MG Cap daily.   • Elastic Bandages & Supports (Wrist Brace/Right Medium) Misc 1 each nightly.   • triamcinolone (ARISTOCORT) 0.1 % ointment Apply to affected areas once daily at night, cover with saran wrap to sleep, stop when smooth, restart when scaly/flaring   • Omega-3 Fatty Acids (FISH OIL) 1000 MG capsule Take 1,000 mg by mouth daily.   • b complex vitamins tablet Take 1 tablet by mouth daily.   • Cholecalciferol (VITAMIN D) 400 UNITS tablet Take 800 Units by mouth daily.   • polyethylene glycol (MIRALAX) powder Take 17 g by mouth daily.     No current facility-administered medications for this visit.        Allergies:  ALLERGIES:   Allergen Reactions   • Zinc SWELLING     (of throat)   • Neosporin [Neomycin-Bacitracin-Polymyxin] SWELLING   • Voltaren      Fingers and legs swell       Past Medical  History/Surgeries:  Past Medical History:   Diagnosis Date   • Amblyopia    • Anemia    • Anesthesia complication     takes long to come out of Gen anesthetic   • Chalazion of right  lower eyelid    • CHF (congestive heart failure)  (CMD)    • Chronic fatigue    • Constipation    • DDD (degenerative disc disease), lumbar    • Delayed emergence from anesthesia    • Diabetes mellitus  (CMD)    • DUB (dysfunctional uterine bleeding)    • Encounter for IUD removal and reinsertion 03/27/2018    Sol Removal and Reinsertion    • Fracture 1981    Left elbow   • GERD (gastroesophageal reflux disease)    • Hemorrhoids    • High cholesterol    • [place] (hard of hearing)    • Hyperlipidemia    • ICD (implantable cardioverter-defibrillator) in place     Medtronic   • Insulin resistance syndrome     on metformin   • Left bundle branch block    • Non-sustained ventricular tachycardia  (CMD)    • Nonischemic cardiomyopathy  (CMD)     dilated (diagnosed in  07/2011)EF 22%        6/5/2019 EF 52%   • OA (osteoarthritis) of knee 10/16/2012    and R foot   • Obesity    • Pelvic pain in female    • Polycystic ovarian syndrome    • Seasonal allergies    • Sleep apnea     uses c-pap instructed to bring   • Spinal stenosis at L4-L5 level    • TMJ (dislocation of temporomandibular joint)    • Uterine fibroid    • Wears contact lenses    • Wears glasses        Past Surgical History:   Procedure Laterality Date   • Anes salpingo-oophorectomy complt/part ( Bilateral 11/07/2019   • Bi-v icd implant  08/03/2011    Medtronic (DIVA)   • Cardiac catherization     • Colonoscopy w/ polypectomy  10/25/2019    Polyps removed, diverticulosis, recall in 3 years, Dr. Humphrey Ruvalcaba   • Colonoscopy w/ polypectomy  04/07/2023    Dr. Humphrey Ruvalcaba, Recall in 3 years, polyps removed, diverticulosis, internal hemorrhoids,   • Hand surgery Right 04/14/2025    Flexor tenosynovectomy, ganglion excision RMF   • Hysterectomy     • Icd generator change  08/13/2015    Medtronic   • Icd generator change  01/12/2023   • Oophorectomy  11/2019   • Total laparoscopic hysterect  11/07/2019       Family History   Problem Relation Age of Onset   •  Osteoarthritis Mother    • Cancer Mother         pre-cancerous mole   • Diabetes Mother    • Hyperlipidemia Mother    • Thyroid Mother    • Cancer, Prostate Father 69   • Osteoarthritis Father    • Asthma Father    • Cancer Father         skin cancer   • Heart disease Father         mitral valve and bypass   • High blood pressure Father    • Hypertension Father    • Cancer Brother         skin cancer       Social History     Tobacco Use   • Smoking status: Never   • Smokeless tobacco: Never   Substance Use Topics   • Alcohol use: Yes     Alcohol/week: 0.0 standard drinks of alcohol     Comment: occasionally        REVIEW OF SYSTEMS     A complete review of systems was performed, and aside from what is mentioned in HPI, was negative.        PHYSICAL EXAM     Visit Vitals  /60   Pulse (!) 60   Ht 5' 5\" (1.651 m)   Wt 82.6 kg (182 lb)   LMP 03/30/2016   BMI 30.29 kg/m²        GENERAL: appears stated age, well developed and well nourished, in no distress and normal affect   SKIN: normal color, normal texture, normal turgor, no skin rashes, no atypical appearing skin lesions and no bruises  EYES: pupils are equal and reactive to light extraocular movements are full sclerae and conjunctivae are normal lids and lashes are normal  LUNGS: no accessory muscle use  HEART: normal rate and rhythm  NEUROLOGIC: motor strength normal, gait and station normal, coordination normal and no tremor noted  EXTREMITIES: no clubbing, no cyanosis, no edema and normal muscle tone and development bilaterally   PSYCH: Mood and affect normal, alert and oriented to person, place, situation.         Sodium (mmol/L)   Date Value   01/15/2025 145     Potassium (mmol/L)   Date Value   01/15/2025 4.3     Chloride (mmol/L)   Date Value   01/15/2025 108     Glucose (mg/dL)   Date Value   01/15/2025 98     Calcium (mg/dL)   Date Value   01/15/2025 9.3     Carbon Dioxide (mmol/L)   Date Value   01/15/2025 30     BUN (mg/dL)   Date Value   01/15/2025  23 (H)     Creatinine (mg/dL)   Date Value   01/15/2025 0.66     Ejection Fraction   Date Value Ref Range Status   06/08/2022 54 % Final        Implantable device personally programmed. Report reviewed and all diagnostics and measurements are within normal ranges for this patient. There were ST events recorded by the device. No changes to the device settings are suggested. Increased the AT detection rate to 167 to avoid ST recordings.              ASSESSMENT/PLAN     BIVICD (implantable cardioverter-defibrillator) in place  Medtronic, implanted origianlly in 2011 with most recent gen change 1/12/23  Some T wave oversensing history, adjusted sensitivity    Nonischemic cardiomyopathy  dilated (diagnosed in  07/2011)  Responded well to CRT with normalized EF    CHF (congestive heart failure)  Currently NYHA class I based on symptoms    Chronic Fatigue  EF is normal, no arrhythmias, has responded to cardiac resynchronization therapy very well. Cardiac cause not idenitified    Hypokalemia  Improved  Followed by nephrologist      Ely marc in follow up of her BIV ICD. She has ST episodes noted. Adjusted AT response. She feels well. Annual visit.      Continue to Observe Discharge

## 2025-08-03 ENCOUNTER — EMERGENCY (EMERGENCY)
Facility: HOSPITAL | Age: 45
LOS: 1 days | End: 2025-08-03
Attending: PERSONAL EMERGENCY RESPONSE ATTENDANT
Payer: COMMERCIAL

## 2025-08-03 VITALS
TEMPERATURE: 98 F | WEIGHT: 184.97 LBS | SYSTOLIC BLOOD PRESSURE: 156 MMHG | HEIGHT: 66 IN | HEART RATE: 119 BPM | DIASTOLIC BLOOD PRESSURE: 88 MMHG | OXYGEN SATURATION: 100 % | RESPIRATION RATE: 20 BRPM

## 2025-08-03 VITALS
DIASTOLIC BLOOD PRESSURE: 82 MMHG | OXYGEN SATURATION: 99 % | TEMPERATURE: 98 F | HEART RATE: 88 BPM | RESPIRATION RATE: 18 BRPM | SYSTOLIC BLOOD PRESSURE: 123 MMHG

## 2025-08-03 DIAGNOSIS — R87.89 OTHER ABNORMAL FINDINGS IN SPECIMENS FROM FEMALE GENITAL ORGANS: Chronic | ICD-10-CM

## 2025-08-03 LAB
ALBUMIN SERPL ELPH-MCNC: 4.3 G/DL — SIGNIFICANT CHANGE UP (ref 3.3–5)
ALP SERPL-CCNC: 79 U/L — SIGNIFICANT CHANGE UP (ref 40–120)
ALT FLD-CCNC: 16 U/L — SIGNIFICANT CHANGE UP (ref 10–45)
ANION GAP SERPL CALC-SCNC: 14 MMOL/L — SIGNIFICANT CHANGE UP (ref 5–17)
AST SERPL-CCNC: 18 U/L — SIGNIFICANT CHANGE UP (ref 10–40)
BASOPHILS # BLD AUTO: 0.07 K/UL — SIGNIFICANT CHANGE UP (ref 0–0.2)
BASOPHILS NFR BLD AUTO: 0.6 % — SIGNIFICANT CHANGE UP (ref 0–2)
BILIRUB SERPL-MCNC: 0.2 MG/DL — SIGNIFICANT CHANGE UP (ref 0.2–1.2)
BUN SERPL-MCNC: 12 MG/DL — SIGNIFICANT CHANGE UP (ref 7–23)
CALCIUM SERPL-MCNC: 9.5 MG/DL — SIGNIFICANT CHANGE UP (ref 8.4–10.5)
CHLORIDE SERPL-SCNC: 104 MMOL/L — SIGNIFICANT CHANGE UP (ref 96–108)
CO2 SERPL-SCNC: 21 MMOL/L — LOW (ref 22–31)
CREAT SERPL-MCNC: 0.56 MG/DL — SIGNIFICANT CHANGE UP (ref 0.5–1.3)
EGFR: 116 ML/MIN/1.73M2 — SIGNIFICANT CHANGE UP
EGFR: 116 ML/MIN/1.73M2 — SIGNIFICANT CHANGE UP
EOSINOPHIL # BLD AUTO: 0.22 K/UL — SIGNIFICANT CHANGE UP (ref 0–0.5)
EOSINOPHIL NFR BLD AUTO: 2 % — SIGNIFICANT CHANGE UP (ref 0–6)
GLUCOSE SERPL-MCNC: 83 MG/DL — SIGNIFICANT CHANGE UP (ref 70–99)
HCG SERPL-ACNC: <2 MIU/ML — SIGNIFICANT CHANGE UP
HCT VFR BLD CALC: 35.3 % — SIGNIFICANT CHANGE UP (ref 34.5–45)
HGB BLD-MCNC: 11.5 G/DL — SIGNIFICANT CHANGE UP (ref 11.5–15.5)
IMM GRANULOCYTES # BLD AUTO: 0.05 K/UL — SIGNIFICANT CHANGE UP (ref 0–0.07)
IMM GRANULOCYTES NFR BLD AUTO: 0.5 % — SIGNIFICANT CHANGE UP (ref 0–0.9)
LYMPHOCYTES # BLD AUTO: 2.93 K/UL — SIGNIFICANT CHANGE UP (ref 1–3.3)
LYMPHOCYTES NFR BLD AUTO: 26.5 % — SIGNIFICANT CHANGE UP (ref 13–44)
MCHC RBC-ENTMCNC: 29.5 PG — SIGNIFICANT CHANGE UP (ref 27–34)
MCHC RBC-ENTMCNC: 32.6 G/DL — SIGNIFICANT CHANGE UP (ref 32–36)
MCV RBC AUTO: 90.5 FL — SIGNIFICANT CHANGE UP (ref 80–100)
MONOCYTES # BLD AUTO: 0.87 K/UL — SIGNIFICANT CHANGE UP (ref 0–0.9)
MONOCYTES NFR BLD AUTO: 7.9 % — SIGNIFICANT CHANGE UP (ref 2–14)
NEUTROPHILS # BLD AUTO: 6.91 K/UL — SIGNIFICANT CHANGE UP (ref 1.8–7.4)
NEUTROPHILS NFR BLD AUTO: 62.5 % — SIGNIFICANT CHANGE UP (ref 43–77)
NRBC # BLD AUTO: 0 K/UL — SIGNIFICANT CHANGE UP (ref 0–0)
NRBC # FLD: 0 K/UL — SIGNIFICANT CHANGE UP (ref 0–0)
NRBC BLD AUTO-RTO: 0 /100 WBCS — SIGNIFICANT CHANGE UP (ref 0–0)
PLATELET # BLD AUTO: 259 K/UL — SIGNIFICANT CHANGE UP (ref 150–400)
PMV BLD: 9.6 FL — SIGNIFICANT CHANGE UP (ref 7–13)
POTASSIUM SERPL-MCNC: 3.5 MMOL/L — SIGNIFICANT CHANGE UP (ref 3.5–5.3)
POTASSIUM SERPL-SCNC: 3.5 MMOL/L — SIGNIFICANT CHANGE UP (ref 3.5–5.3)
PROT SERPL-MCNC: 7.3 G/DL — SIGNIFICANT CHANGE UP (ref 6–8.3)
RBC # BLD: 3.9 M/UL — SIGNIFICANT CHANGE UP (ref 3.8–5.2)
RBC # FLD: 13.2 % — SIGNIFICANT CHANGE UP (ref 10.3–14.5)
SODIUM SERPL-SCNC: 139 MMOL/L — SIGNIFICANT CHANGE UP (ref 135–145)
TROPONIN T, HIGH SENSITIVITY RESULT: <6 NG/L — SIGNIFICANT CHANGE UP (ref 0–51)
WBC # BLD: 11.05 K/UL — HIGH (ref 3.8–10.5)
WBC # FLD AUTO: 11.05 K/UL — HIGH (ref 3.8–10.5)

## 2025-08-03 PROCEDURE — 84702 CHORIONIC GONADOTROPIN TEST: CPT

## 2025-08-03 PROCEDURE — 99285 EMERGENCY DEPT VISIT HI MDM: CPT | Mod: 25

## 2025-08-03 PROCEDURE — 99285 EMERGENCY DEPT VISIT HI MDM: CPT

## 2025-08-03 PROCEDURE — 80053 COMPREHEN METABOLIC PANEL: CPT

## 2025-08-03 PROCEDURE — 84443 ASSAY THYROID STIM HORMONE: CPT

## 2025-08-03 PROCEDURE — 93010 ELECTROCARDIOGRAM REPORT: CPT

## 2025-08-03 PROCEDURE — 85025 COMPLETE CBC W/AUTO DIFF WBC: CPT

## 2025-08-03 PROCEDURE — 93005 ELECTROCARDIOGRAM TRACING: CPT

## 2025-08-03 PROCEDURE — 84484 ASSAY OF TROPONIN QUANT: CPT

## 2025-08-03 PROCEDURE — 71046 X-RAY EXAM CHEST 2 VIEWS: CPT | Mod: 26

## 2025-08-03 PROCEDURE — 71046 X-RAY EXAM CHEST 2 VIEWS: CPT

## 2025-08-04 LAB — TSH SERPL-MCNC: 1.5 UIU/ML — SIGNIFICANT CHANGE UP (ref 0.27–4.2)
